# Patient Record
Sex: FEMALE | Race: WHITE | NOT HISPANIC OR LATINO | Employment: OTHER | URBAN - METROPOLITAN AREA
[De-identification: names, ages, dates, MRNs, and addresses within clinical notes are randomized per-mention and may not be internally consistent; named-entity substitution may affect disease eponyms.]

---

## 2017-01-17 ENCOUNTER — OFFICE VISIT (OUTPATIENT)
Dept: ENDOCRINOLOGY | Age: 32
End: 2017-01-17

## 2017-01-17 VITALS
HEIGHT: 62 IN | BODY MASS INDEX: 20.06 KG/M2 | WEIGHT: 109 LBS | DIASTOLIC BLOOD PRESSURE: 68 MMHG | SYSTOLIC BLOOD PRESSURE: 100 MMHG | HEART RATE: 106 BPM

## 2017-01-17 DIAGNOSIS — E10.8 TYPE 1 DIABETES MELLITUS WITH COMPLICATIONS (HCC): ICD-10-CM

## 2017-01-17 DIAGNOSIS — IMO0002 DIABETES MELLITUS TYPE 1, UNCONTROLLED, WITH COMPLICATIONS: Primary | ICD-10-CM

## 2017-01-17 DIAGNOSIS — E16.1 HYPERINSULINISM: ICD-10-CM

## 2017-01-17 DIAGNOSIS — G40.909 SEIZURE DISORDER (HCC): ICD-10-CM

## 2017-01-17 DIAGNOSIS — E55.9 VITAMIN D DEFICIENCY: ICD-10-CM

## 2017-01-17 PROCEDURE — 99214 OFFICE O/P EST MOD 30 MIN: CPT | Performed by: INTERNAL MEDICINE

## 2017-01-17 NOTE — MR AVS SNAPSHOT
Paradise Catherine   1/17/2017 1:15 PM   Office Visit    Dept Phone:  [phone]   Encounter #:  [id number]    Provider:  Giovanni MORGAN MD   Department:  [facility] ENDOCRINOLOGY                Your Full Care Plan              Today's Medication Changes          These changes are accurate as of: 1/17/17  1:59 PM.  If you have any questions, ask your nurse or doctor.               Medication(s)that have changed:     Diapers & Supplies misc   Depends pull-ups in small/medium.  Use as needed.  Needs 2 cases every other month.   What changed:  Another medication with the same name was removed. Continue taking this medication, and follow the directions you see here.   Changed by:  Katrin Cast MA       glucagon 1 MG injection   Commonly known as:  GLUCAGEN   Inject 1 mg into the shoulder, thigh, or buttocks 1 (One) Time As Needed for low blood sugar for up to 1 dose.   What changed:    - how much to take  - how to take this  - when to take this  - reasons to take this  - additional instructions   Changed by:  Giovanni MORGAN MD       glucose blood test strip   Commonly known as:  ONETOUCH VERIO   1 each by Other route 5 (Five) Times a Day. Use as instructed   What changed:  Another medication with the same name was removed. Continue taking this medication, and follow the directions you see here.   Changed by:  Giovanni MORGAN MD       Insulin Pen Needle 32G X 4 MM misc   1 each 5 (Five) Times a Day.   What changed:  Another medication with the same name was removed. Continue taking this medication, and follow the directions you see here.   Changed by:  Giovanni MORGAN MD         Stop taking medication(s)listed here:     aspirin-acetaminophen-caffeine 250-250-65 MG per tablet   Commonly known as:  EXCEDRIN MIGRAINE   Stopped by:  Giovanni MORGAN MD           loratadine 10 MG tablet   Commonly known as:  CLARITIN   Stopped by:  Giovanni  Joshua MORGAN MD           montelukast 10 MG tablet   Commonly known as:  SINGULAIR   Stopped by:  Giovanni MORGAN MD           raNITIdine 150 MG tablet   Commonly known as:  ZANTAC   Stopped by:  Giovanni MORGAN MD                Where to Get Your Medications      These medications were sent to [facility], [place] - [address] [address] - [phone]  - [phone]   [address] [facility] [address] [place] [address]     Phone:  [phone]     glucagon 1 MG injection    glucose blood test strip    Insulin Pen Needle 32G X 4 MM misc                  Your Updated Medication List          This list is accurate as of: 1/17/17  1:59 PM.  Always use your most recent med list.                acetaminophen 325 MG tablet   Commonly known as:  TYLENOL   Take 1 tablet by mouth every 6 (six) hours as needed for mild pain (1-3).       Cholecalciferol 2000 UNITS capsule       Diapers & Supplies misc   Depends pull-ups in small/medium.  Use as needed.  Needs 2 cases every other month.       DIASTAT ACUDIAL 20 MG rectal kit   Generic drug:  diazepam   INSERT 1 SYRINGE RECTALLY       diphenhydrAMINE 25 MG tablet   Commonly known as:  BENADRYL   Take 1 tablet by mouth every 6 (six) hours as needed for itching.       FLUoxetine 20 MG capsule   Commonly known as:  PROzac   Take 1 capsule by mouth daily.       glucagon 1 MG injection   Commonly known as:  GLUCAGEN   Inject 1 mg into the shoulder, thigh, or buttocks 1 (One) Time As Needed for low blood sugar for up to 1 dose.       glucose blood test strip   Commonly known as:  ONETOUCH VERIO   1 each by Other route 5 (Five) Times a Day. Use as instructed       insulin aspart 100 UNIT/ML solution pen-injector sc pen   Commonly known as:  novoLOG FLEXPEN   As directed 8- units per meal sc tidac       insulin detemir 100 UNIT/ML injection   Commonly known as:  LEVEMIR   Inject 40 Units under the skin every night.       Insulin Pen Needle 32G X 4 MM misc   1 each 5  (Five) Times a Day.       levocetirizine 5 MG tablet   Commonly known as:  XYZAL       omalizumab 150 MG injection   Commonly known as:  XOLAIR       OXcarbazepine 600 MG tablet   Commonly known as:  TRILEPTAL       SUNSCREEN SPF50 lotion   Apply generously every 2 hours while outside.       topiramate 25 MG tablet   Commonly known as:  TOPAMAX               We Performed the Following     Comprehensive Metabolic Panel     Hemoglobin A1c       You Were Diagnosed With        Codes Comments    Diabetes mellitus type 1, uncontrolled, with complications    -  Primary ICD-10-CM: E10.8, E10.65  ICD-9-CM: 250.93     Type 1 diabetes mellitus with complications     ICD-10-CM: E10.8  ICD-9-CM: 250.91     Hyperinsulinism     ICD-10-CM: E16.1  ICD-9-CM: 251.1       Instructions     None    Patient Instructions History      Upcoming Appointments     Visit Type Date Time Department    OFFICE VISIT 1/17/2017  1:15 PM [facility]    FOLLOW UP 1/19/2017  3:30 PM [facility]    OFFICE VISIT 5/12/2017  1:15 PM [facility]DE WATERSU      [employer] Signup     Our records indicate that you have an active [employer] account.    You can view your After Visit Summary by going to [employer] and logging in with your [employer] username and password.  If you don't have a [employer] username and password but a parent or guardian has access to your record, the parent or guardian should login with their own [employer] username and password and access your record to view the After Visit Summary.    If you have questions, you can email [email or web address] or call [phone] to talk to our [employer] staff.  Remember, [employer] is NOT to be used for urgent needs.  For medical emergencies, dial 911.               Other Info from Your Visit           Your Appointments     Jan 19, 2017  3:30 PM EST   Follow Up with Mickey Vyas Jr., MD   [facility] (--)    [address]  [address]  [facility] [address] [phone]           Arrive 15 minutes prior to appointment.            May 12, 2017  1:15 PM EDT   Office Visit with Giovanni MORGAN MD   [facility] ENDOCRINOLOGY (--)    [address] [facility] [address]  [facility] [address] [phone]           Arrive 15 minutes prior to appointment.              Allergies     No Known Allergies      Reason for Visit     Diabetes           Vital Signs     Blood Pressure Pulse Height Weight Body Mass Index Smoking Status    100/68 106 62\" (157.5 cm) 109 lb (49.4 kg) 19.94 kg/m2 Never Smoker      Problems and Diagnoses Noted     Diabetes mellitus type 1, uncontrolled, with complications    Hyperinsulinism    Type I diabetes with complications    Uncontrolled type 2 diabetes mellitus with complication, with long-term current use of insulin        "

## 2017-01-17 NOTE — PROGRESS NOTES
31 y.o.    Patient Care Team:  Drew Blanchard MD as PCP - General (Family Medicine)  Angel Roque MD as PCP - Family Medicine    Chief Complaint:        F/U TYPE 1 DIABETES, UNCONTROLLED.  Subjective     HPI  Patient is a 31-year-old white female with a past history of severe mental retardation and behavioral problems, seizure disorder, type 1 diabetes since age 18 came for followup. Patient is accompanied by her parents    Patient's blood sugars have been fluctuating in the  range  She has been compliant with insulin regimen  Hypoglycemia  Patient has had episodes of hypoglycemia but appear to be much less than before. She spends a portion of the day at the .    Seizure disorder  Patient is currently on medication appears to be stable and she's nonverbal  Vitamin D deficiency  Patient is currently getting 2000 units a vitamin D3 daily at dinner    Interval History  Patient is a 31-year-old white female with a history of mental retardation and behavioral problems, seizure disorder, type I diabetes mellitus since age 18 came for followup evaluation. Patient was brought in by both parents who are the primary historians since patient is noncommunicative.  They reported that patient was diabetic for the past 10 years, has been on different insulin regimens, a few years ago started Levemir and NovoLog, at that time they were seeing an endocrinologist in [place].  She gets 29 units of l Levemir in the morning and 5 units at night, NovoLog 1 unit for every 15 carbs and 1 unit for every 30-50 mg a blood sugar over 150 for correction.  They reported usual hypoglycemia in the early morning hours, and higher blood sugars in the 200-250 range at dinnertime or suppertime. Patient apparently spends most of the day at the , and sometimes she does not get NovoLog for lunch at the . Apparently the staff decide to give insulin or not give insulin based on patient's behavior and blood  sugar.  both parents denied any knowledge of diabetic retinopathy, nephropathy, neuropathy   Does not denied any knowledge of thyroid disorder  Mother is trying to get her eye examination done sometime this year  Patient is mostly nonverbal, gives only a momentary eye contact.  According to the parents patient has done relatively well since her last adjustment of insulin dose  Most of the blood sugars have been less than 200 at home. The  center did not send any Accu-Chek log this time.   Parents report that patient developed a rash with severe itching over the past several months and has been on prednisone on and off. Currently she is taking 10 mg prednisone taper and will probably on this medication for several weeks. Patient's blood sugars have been elevated in the 200-300 range and they have been increasing the insulin and gradually.  She is already seeing the dermatologist and 2 allergy specialists with no clear etiology identified. Medication list was reviewed but did not seem specifically connected to the rash.  Interval history  Mother reported that since they decided Levemir and NovoLog were not causing the rash as per the allergist she started going back on these medications. She has been on prednisone for the past several months and her blood sugars have been in the 200-400 range. They started tapering the prednisone and currently she is taking 5 mg daily and will soon start taking 5 mg every other day.  She has been seeing allergist and is currently receiving new medication Zolair IV first dose was 2 weeks ago  They believe that her rash has significantly improved just within the past week     The following portions of the patient's history were reviewed and updated as appropriate: allergies, current medications, past family history, past medical history, past social history, past surgical history and problem list.    Past Medical History   Diagnosis Date   • Cerebral palsy      birth   • Diabetes  mellitus    • Diabetes mellitus type I    • Hives      chronic   • Seizures      Family History   Problem Relation Age of Onset   • Hypertension Mother    • Hyperlipidemia Mother    • Seasonal affective disorder Father      Social History     Social History   • Marital status: Single     Spouse name: N/A   • Number of children: N/A   • Years of education: N/A     Occupational History   • Not on file.     Social History Main Topics   • Smoking status: Never Smoker   • Smokeless tobacco: Not on file   • Alcohol use No   • Drug use: Not on file   • Sexual activity: No     Other Topics Concern   • Not on file     Social History Narrative     No Known Allergies    Current Outpatient Prescriptions:   •  acetaminophen (TYLENOL) 325 MG tablet, Take 1 tablet by mouth every 6 (six) hours as needed for mild pain (1-3)., Disp: 30 tablet, Rfl: 11  •  aspirin-acetaminophen-caffeine (EXCEDRIN MIGRAINE) 250-250-65 MG per tablet, Take 1 tablet by mouth every 6 (six) hours as needed for headaches., Disp: 30 tablet, Rfl: 11  •  Cholecalciferol 2000 UNITS capsule, Take  by mouth., Disp: , Rfl:   •  Diapers & Supplies misc, Depends pull-ups in small/medium.  Use as needed.  Needs 2 cases every other month., Disp: 2 each, Rfl: 5  •  Diapers & Supplies misc, Chux absorbent pads.   Use as needed. Needs 1 case monthly., Disp: 1 each, Rfl: 11  •  DIASTAT ACUDIAL 20 MG rectal kit, INSERT 1 SYRINGE RECTALLY, Disp: 20 mg, Rfl: 4  •  diphenhydrAMINE (BENADRYL) 25 MG tablet, Take 1 tablet by mouth every 6 (six) hours as needed for itching., Disp: 30 tablet, Rfl: 11  •  FLUoxetine (PROzac) 20 MG capsule, Take 1 capsule by mouth daily., Disp: 30 capsule, Rfl: 11  •  glucagon (GLUCAGEN) 1 MG injection, Glucagon Emergency 1 MG Injection Kit; Patient Sig: Glucagon Emergency 1 MG Injection Kit USE AS DIRECTED; 4; 3; 10-Mar-2015; Active, Disp: , Rfl:   •  glucose blood (ONETOUCH VERIO) test strip, 1 each by Other route 5 (five) times a day. Use as  "instructed, Disp: 150 each, Rfl: 5  •  glucose blood test strip, 1 each by Other route 5 (five) times a day., Disp: 150 each, Rfl: 5  •  insulin aspart (NOVOLOG FLEXPEN) 100 UNIT/ML solution pen-injector sc pen, As directed 8- units per meal sc tidac, Disp: 15 mL, Rfl: 11  •  insulin detemir (LEVEMIR) 100 UNIT/ML injection, Inject 40 Units under the skin every night., Disp: 5 pen, Rfl: 11  •  Insulin Pen Needle 32G X 4 MM misc, , Disp: , Rfl:   •  Insulin Pen Needle 32G X 4 MM misc, 1 each 5 (five) times a day., Disp: 150 each, Rfl: 5  •  levocetirizine (XYZAL) 5 MG tablet, Take 5 mg by mouth every evening., Disp: , Rfl:   •  loratadine (CLARITIN) 10 MG tablet, Take  by mouth., Disp: , Rfl:   •  montelukast (SINGULAIR) 10 MG tablet, Take  by mouth daily., Disp: , Rfl:   •  omalizumab (XOLAIR) 150 MG injection, Inject 300 mg under the skin 1 (one) time., Disp: , Rfl:   •  OXcarbazepine (TRILEPTAL) 600 MG tablet, Take 600 mg by mouth 2 (two) times a day. Take 1 1/2 tab bid, Disp: , Rfl:   •  ranitidine (ZANTAC) 150 MG tablet, Take  by mouth., Disp: , Rfl:   •  SUNSCREEN SPF50 lotion, Apply generously every 2 hours while outside., Disp: 1 bottle, Rfl: 11  •  topiramate (TOPAMAX) 25 MG tablet, Take 75 mg by mouth 2 (two) times a day., Disp: , Rfl:         Review of Systems   Constitutional: Negative for chills, fatigue and fever.   Cardiovascular: Negative for chest pain and palpitations.   Gastrointestinal: Negative for abdominal pain, constipation, diarrhea, nausea and vomiting.   Endocrine: Negative for cold intolerance and heat intolerance.   All other systems reviewed and are negative.      Objective       Vitals:    01/17/17 1314   BP: 100/68   Pulse: 106   Weight: 109 lb (49.4 kg)   Height: 62\" (157.5 cm)     Body mass index is 19.94 kg/(m^2).      Physical Exam   Neck: Normal range of motion. Neck supple. No thyromegaly present.   Cardiovascular: Normal rate, regular rhythm, normal heart sounds and intact distal " pulses.    Pulmonary/Chest: Effort normal and breath sounds normal.   Abdominal: Soft. Bowel sounds are normal. She exhibits no distension. There is no tenderness.   Musculoskeletal: Normal range of motion. She exhibits no edema.   Skin: Skin is warm and dry.   Psychiatric: Her behavior is normal.   Nursing note and vitals reviewed.    Results Review:     I reviewed the patient's new clinical results.    Medical records reviewed  Summary:      Office Visit on 07/18/2016   Component Date Value Ref Range Status   • Glucose 07/18/2016 72  65 - 99 mg/dL Final   • BUN 07/18/2016 16  6 - 20 mg/dL Final   • Creatinine 07/18/2016 0.64  0.57 - 1.00 mg/dL Final   • eGFR Non African Am 07/18/2016 109  >60 mL/min/1.73 Final   • eGFR African Am 07/18/2016 132  >60 mL/min/1.73 Final   • BUN/Creatinine Ratio 07/18/2016 25.0  7.0 - 25.0 Final   • Sodium 07/18/2016 141  136 - 145 mmol/L Final   • Potassium 07/18/2016 4.2  3.5 - 5.2 mmol/L Final   • Chloride 07/18/2016 105  98 - 107 mmol/L Final   • Total CO2 07/18/2016 22.3  22.0 - 29.0 mmol/L Final   • Calcium 07/18/2016 8.9  8.6 - 10.5 mg/dL Final   • Total Protein 07/18/2016 6.9  6.0 - 8.5 g/dL Final   • Albumin 07/18/2016 4.40  3.50 - 5.20 g/dL Final   • Globulin 07/18/2016 2.5  gm/dL Final   • A/G Ratio 07/18/2016 1.8  g/dL Final   • Total Bilirubin 07/18/2016 <0.2  0.1 - 1.2 mg/dL Final   • Alkaline Phosphatase 07/18/2016 98  39 - 117 U/L Final   • AST (SGOT) 07/18/2016 17  1 - 32 U/L Final   • ALT (SGPT) 07/18/2016 11  1 - 33 U/L Final   • Hemoglobin A1C 07/18/2016 6.00* 4.80 - 5.60 % Final    Comment: Hemoglobin A1C Ranges:  Increased Risk for Diabetes  5.7% to 6.4%  Diabetes                     >= 6.5%  Diabetic Goal                < 7.0%     • 25 Hydroxy, Vitamin D 07/18/2016 46.5  ng/mL Final    Comment: Reference Range for Total Vitamin D 25(OH)  Deficiency    <20.0 ng/mL  Insufficiency 21-29 ng/mL  Sufficiency    ng/mL  Toxicity      >100 ng/ml          Lab  Results   Component Value Date    HGBA1C 6.00 (H) 07/18/2016    HGBA1C 7.5 (H) 01/11/2016    HGBA1C 5.7 (H) 01/20/2015     Lab Results   Component Value Date    CREATININE 0.64 07/18/2016     Imaging Results (most recent)     None                Assessment and Plan:    Paradise was seen today for diabetes.    Diagnoses and all orders for this visit:    Diabetes mellitus type 1, uncontrolled, with complications  -     Comprehensive Metabolic Panel  -     Hemoglobin A1c    Type 1 diabetes mellitus with complications  -     glucose blood (ONETOUCH VERIO) test strip; 1 each by Other route 5 (Five) Times a Day. Use as instructed  -     Insulin Pen Needle 32G X 4 MM misc; 1 each 5 (Five) Times a Day.  -     Comprehensive Metabolic Panel  -     Hemoglobin A1c    Hyperinsulinism  -     Comprehensive Metabolic Panel  -     Hemoglobin A1c    Seizure disorder    Vitamin D deficiency    Other orders  -     glucagon (GLUCAGEN) 1 MG injection; Inject 1 mg into the shoulder, thigh, or buttocks 1 (One) Time As Needed for low blood sugar for up to 1 dose.        #1 uncontrolled type I diabetes mellitus with the last hemoglobin A1c was 6.9%   #2 hypoglycemia  #3 seizure disorder currently on medication but apparently hypoglycemia is the trigger, and they are trying to avoid that.  #4 vitamin D deficiency patient is currently taking 1000 units daily    Glucometer download reviewed  Blood sugars fluctuating from   Especially for one week she had low blood sugars and elevated blood sugars due to sickness otherwise she had very few hypoglycemic episodes.    She is currently eating well  Taking Levemir 32 units in the morning and 12 units at night  NovoLog as per the plan  Patient spends half a day at the   I recommend that Accu-Chek be checked at lunchtime as well as any other time as needed  Patient will get lab testing done today  She will return to follow-up in 3 months.    The total time spent for old record and lab review  and face- to- face was more than 25 min of which greater than 50% of time was spent on counseling the patient on recommended evaluation and treatment options, instructions for management/treatment and /or follow up  and importance of compliance with chosen management or treatment options       Giovanni Lewis MD. FACE    01/17/17      EMR Dragon / transcription disclaimer:    Much of this encounter note is an electronic transcription/ translation of spoken language to printed text.  Electronic translation of spoken language may permit erroneous, or at times, nonsensical words or phrases to be inadvertently transcribed; although I have reviewed the note for such errors, some may still exist.

## 2017-01-18 LAB
ALBUMIN SERPL-MCNC: 4.2 G/DL (ref 3.5–5.2)
ALBUMIN/GLOB SERPL: 1.8 G/DL
ALP SERPL-CCNC: 80 U/L (ref 39–117)
ALT SERPL-CCNC: 20 U/L (ref 1–33)
AST SERPL-CCNC: 19 U/L (ref 1–32)
BILIRUB SERPL-MCNC: 0.3 MG/DL (ref 0.1–1.2)
BUN SERPL-MCNC: 14 MG/DL (ref 6–20)
BUN/CREAT SERPL: 22.6 (ref 7–25)
CALCIUM SERPL-MCNC: 8.8 MG/DL (ref 8.6–10.5)
CHLORIDE SERPL-SCNC: 103 MMOL/L (ref 98–107)
CO2 SERPL-SCNC: 22.7 MMOL/L (ref 22–29)
CREAT SERPL-MCNC: 0.62 MG/DL (ref 0.57–1)
GLOBULIN SER CALC-MCNC: 2.4 GM/DL
GLUCOSE SERPL-MCNC: 71 MG/DL (ref 65–99)
HBA1C MFR BLD: 5.71 % (ref 4.8–5.6)
POTASSIUM SERPL-SCNC: 3.7 MMOL/L (ref 3.5–5.2)
PROT SERPL-MCNC: 6.6 G/DL (ref 6–8.5)
SODIUM SERPL-SCNC: 141 MMOL/L (ref 136–145)

## 2017-01-19 ENCOUNTER — OFFICE VISIT (OUTPATIENT)
Dept: NEUROLOGY | Facility: CLINIC | Age: 32
End: 2017-01-19

## 2017-01-19 VITALS — BODY MASS INDEX: 20.06 KG/M2 | WEIGHT: 109 LBS | HEIGHT: 62 IN

## 2017-01-19 DIAGNOSIS — G40.909 SEIZURE DISORDER (HCC): ICD-10-CM

## 2017-01-19 DIAGNOSIS — G80.1 SPASTIC DIPLEGIC CEREBRAL PALSY (HCC): Primary | ICD-10-CM

## 2017-01-19 PROCEDURE — 99213 OFFICE O/P EST LOW 20 MIN: CPT | Performed by: PSYCHIATRY & NEUROLOGY

## 2017-01-19 RX ORDER — TOPIRAMATE 25 MG/1
75 TABLET ORAL 2 TIMES DAILY
Qty: 180 TABLET | Refills: 11 | Status: SHIPPED | OUTPATIENT
Start: 2017-01-19 | End: 2018-01-23 | Stop reason: SDUPTHER

## 2017-01-19 RX ORDER — OXCARBAZEPINE 600 MG/1
600 TABLET, FILM COATED ORAL 2 TIMES DAILY
Qty: 90 TABLET | Refills: 11 | Status: SHIPPED | OUTPATIENT
Start: 2017-01-19 | End: 2018-01-23 | Stop reason: SDUPTHER

## 2017-01-19 RX ORDER — DIAZEPAM 20 MG/4ML
20 GEL RECTAL ONCE
Qty: 20 MG | Refills: 4 | Status: SHIPPED | OUTPATIENT
Start: 2017-01-19 | End: 2017-01-19

## 2017-01-19 NOTE — LETTER
January 19, 2017     Drew Blanchard MD  [address]  [place] [address]    Patient: Paradise Catherine   YOB: 1985   Date of Visit: 1/19/2017       Dear Dr. Lo MD:    Thank you for referring Paradise Catherine to me for evaluation. Below are the relevant portions of my assessment and plan of care.    If you have questions, please do not hesitate to call me. I look forward to following Paradise along with you.         Sincerely,        Mickey Vyas MD        CC: No Recipients

## 2017-01-19 NOTE — MR AVS SNAPSHOT
Paradise Catherine   1/19/2017 3:30 PM   Office Visit    Dept Phone:  [phone]   Encounter #:  [id number]    Provider:  Mickey Vyas Jr., MD   Department:  [facility] NEUROLOGY                Your Full Care Plan              Today's Medication Changes          These changes are accurate as of: 1/19/17  4:01 PM.  If you have any questions, ask your nurse or doctor.               Medication(s)that have changed:     DIASTAT ACUDIAL 20 MG rectal kit   Generic drug:  diazepam   Insert 20 mg into the rectum 1 (One) Time for 1 dose.   What changed:  See the new instructions.   Changed by:  Mickey Vyas Jr., MD            Where to Get Your Medications      These medications were sent to [address] [phone] [facility] [phone]   [address] [address]     Phone:  [phone]     OXcarbazepine 600 MG tablet    topiramate 25 MG tablet         You can get these medications from any pharmacy     Bring a paper prescription for each of these medications     DIASTAT ACUDIAL 20 MG rectal kit                  Your Updated Medication List          This list is accurate as of: 1/19/17  4:01 PM.  Always use your most recent med list.                acetaminophen 325 MG tablet   Commonly known as:  TYLENOL   Take 1 tablet by mouth every 6 (six) hours as needed for mild pain (1-3).       Cholecalciferol 2000 UNITS capsule       Diapers & Supplies misc   Depends pull-ups in small/medium.  Use as needed.  Needs 2 cases every other month.       DIASTAT ACUDIAL 20 MG rectal kit   Generic drug:  diazepam   Insert 20 mg into the rectum 1 (One) Time for 1 dose.       diphenhydrAMINE 25 MG tablet   Commonly known as:  BENADRYL   Take 1 tablet by mouth every 6 (six) hours as needed for itching.       FLUoxetine 20 MG capsule   Commonly known as:  PROzac   Take 1 capsule by mouth daily.       glucagon 1 MG injection   Commonly  known as:  GLUCAGEN   Inject 1 mg into the shoulder, thigh, or buttocks 1 (One) Time As Needed for low blood sugar for up to 1 dose.       glucose blood test strip   Commonly known as:  ONETOUCH VERIO   1 each by Other route 5 (Five) Times a Day. Use as instructed       insulin aspart 100 UNIT/ML solution pen-injector sc pen   Commonly known as:  novoLOG FLEXPEN   As directed 8- units per meal sc tidac       insulin detemir 100 UNIT/ML injection   Commonly known as:  LEVEMIR   Inject 40 Units under the skin every night.       Insulin Pen Needle 32G X 4 MM misc   1 each 5 (Five) Times a Day.       levocetirizine 5 MG tablet   Commonly known as:  XYZAL       omalizumab 150 MG injection   Commonly known as:  XOLAIR       OXcarbazepine 600 MG tablet   Commonly known as:  TRILEPTAL   Take 1 tablet by mouth 2 (Two) Times a Day. Take 1 1/2 tab bid       SUNSCREEN SPF50 lotion   Apply generously every 2 hours while outside.       topiramate 25 MG tablet   Commonly known as:  TOPAMAX   Take 3 tablets by mouth 2 (Two) Times a Day.               You Were Diagnosed With        Codes Comments    Spastic diplegic cerebral palsy    -  Primary ICD-10-CM: G80.1  ICD-9-CM: 343.0     Seizure disorder     ICD-10-CM: G40.909  ICD-9-CM: 345.90       Instructions     None    Patient Instructions History      Upcoming Appointments     Visit Type Date Time Department    FOLLOW UP 1/19/2017  3:30 PM [facility] NEUROLOGY MARCIASGE    OFFICE VISIT 5/12/2017  1:15 PM [facility] ENDO KREE MELLY      [employer] Signup     Our records indicate that you have an active [employer] account.    You can view your After Visit Summary by going to [employer] and logging in with your [employer] username and password.  If you don't have a [employer] username and password but a parent or guardian has access to your record, the parent or guardian should login with their own [employer] username and password and access your record to view the After Visit  "Summary.    If you have questions, you can email [email or web address] or call [phone] to talk to our MyChart staff.  Remember, [employer] is NOT to be used for urgent needs.  For medical emergencies, dial 911.               Other Info from Your Visit           Your Appointments     May 12, 2017  1:15 PM EDT   Office Visit with Giovanni MORGAN MD   [facility] ENDOCRINOLOGY (--)    [address]  [facility] [address] [phone]           Arrive 15 minutes prior to appointment.            Jan 23, 2018  2:00 PM EST   Follow Up with Mickey Vyas Jr., MD   [facility] NEUROLOGY (--)    [address]  [facility] [address] [phone]           Arrive 15 minutes prior to appointment.              Allergies     No Known Allergies      Reason for Visit     Seizures           Vital Signs     Height Weight Body Mass Index Smoking Status          62\" (157.5 cm) 109 lb (49.4 kg) 19.94 kg/m2 Never Smoker        Problems and Diagnoses Noted     Cerebral palsy    Seizure disorder        "

## 2017-01-19 NOTE — PROGRESS NOTES
Subjective:     Patient ID: Paradise Catherine is a 31 y.o. female.    History of Present Illness   The patient is doing relatively well from a seizure standpoint.  Medications were reviewed.  She's not having side effects.  She does have an occasional breakthrough seizure and on rare occasion she has required Diastat.  The parents are happy with her control.  History was taken from both parents as well as.  The following portions of the patient's history were reviewed and updated as appropriate: allergies, current medications, past family history, past medical history, past social history, past surgical history and problem list.      Current Outpatient Prescriptions:   •  acetaminophen (TYLENOL) 325 MG tablet, Take 1 tablet by mouth every 6 (six) hours as needed for mild pain (1-3)., Disp: 30 tablet, Rfl: 11  •  Cholecalciferol 2000 UNITS capsule, Take  by mouth., Disp: , Rfl:   •  Diapers & Supplies misc, Depends pull-ups in small/medium.  Use as needed.  Needs 2 cases every other month., Disp: 2 each, Rfl: 5  •  DIASTAT ACUDIAL 20 MG rectal kit, Insert 20 mg into the rectum 1 (One) Time for 1 dose., Disp: 20 mg, Rfl: 4  •  diphenhydrAMINE (BENADRYL) 25 MG tablet, Take 1 tablet by mouth every 6 (six) hours as needed for itching., Disp: 30 tablet, Rfl: 11  •  FLUoxetine (PROzac) 20 MG capsule, Take 1 capsule by mouth daily., Disp: 30 capsule, Rfl: 11  •  glucagon (GLUCAGEN) 1 MG injection, Inject 1 mg into the shoulder, thigh, or buttocks 1 (One) Time As Needed for low blood sugar for up to 1 dose., Disp: 2 kit, Rfl: 6  •  glucose blood (ONETOUCH VERIO) test strip, 1 each by Other route 5 (Five) Times a Day. Use as instructed, Disp: 150 each, Rfl: 5  •  insulin aspart (NOVOLOG FLEXPEN) 100 UNIT/ML solution pen-injector sc pen, As directed 8- units per meal sc tidac, Disp: 15 mL, Rfl: 11  •  insulin detemir (LEVEMIR) 100 UNIT/ML injection, Inject 40 Units under the skin every night., Disp: 5 pen, Rfl: 11  •  Insulin  Pen Needle 32G X 4 MM misc, 1 each 5 (Five) Times a Day., Disp: 150 each, Rfl: 5  •  levocetirizine (XYZAL) 5 MG tablet, Take 5 mg by mouth every evening., Disp: , Rfl:   •  omalizumab (XOLAIR) 150 MG injection, Inject 300 mg under the skin 1 (one) time., Disp: , Rfl:   •  OXcarbazepine (TRILEPTAL) 600 MG tablet, Take 1 tablet by mouth 2 (Two) Times a Day. Take 1 1/2 tab bid, Disp: 90 tablet, Rfl: 11  •  SUNSCREEN SPF50 lotion, Apply generously every 2 hours while outside., Disp: 1 bottle, Rfl: 11  •  topiramate (TOPAMAX) 25 MG tablet, Take 3 tablets by mouth 2 (Two) Times a Day., Disp: 180 tablet, Rfl: 11    Review of Systems   Constitutional: Negative.    Neurological: Positive for seizures (LAST SEIZURE WAS 2 WEEKS AGO). Negative for dizziness, tremors, syncope, facial asymmetry, speech difficulty, weakness, light-headedness, numbness and headaches.   Psychiatric/Behavioral: Negative.         Objective:    Neurologic Exam  The patient is mentally handicapped.  She has spastic diplegia.  Physical Exam    Assessment/Plan:     Paradise was seen today for seizures.    Diagnoses and all orders for this visit:    Spastic diplegic cerebral palsy    Seizure disorder    Other orders  -     topiramate (TOPAMAX) 25 MG tablet; Take 3 tablets by mouth 2 (Two) Times a Day.  -     OXcarbazepine (TRILEPTAL) 600 MG tablet; Take 1 tablet by mouth 2 (Two) Times a Day. Take 1 1/2 tab bid  -     DIASTAT ACUDIAL 20 MG rectal kit; Insert 20 mg into the rectum 1 (One) Time for 1 dose.       Prescription drug management - medications as above    Follow-up in the office in one year.Thank you for allowing me to share in the care of this patient.  Mickey Vyas M.D.

## 2017-03-23 ENCOUNTER — TELEPHONE (OUTPATIENT)
Dept: INTERNAL MEDICINE | Facility: CLINIC | Age: 32
End: 2017-03-23

## 2017-03-23 NOTE — TELEPHONE ENCOUNTER
----- Message from Mireille Nayak MA sent at 3/21/2017  3:34 PM EDT -----      ----- Message -----     From: Katrin Tong     Sent: 3/21/2017   2:59 PM       To: Mireille Nayak MA    Trish - mother [phone]    Patient is needing written/printed scripts for:    1.  Acetaminophen 325 mg tablet 1 Q 6 hrs PRN mild pain #30  2.  Diphenhydramine 25 mg 1 Q 6 hrs. PRN itching  3.  Sunscreen SPF 50 lotion apply chapito every 2 hours while outside    Trish said that these will be for Apple Patch and her current scripts are .    Dr. Blanchard

## 2017-04-04 RX ORDER — DIPHENHYDRAMINE HCL 25 MG
25 TABLET ORAL EVERY 6 HOURS PRN
Qty: 30 TABLET | Refills: 11 | Status: SHIPPED | OUTPATIENT
Start: 2017-04-04 | End: 2017-04-04 | Stop reason: SDUPTHER

## 2017-04-04 RX ORDER — ACETAMINOPHEN 325 MG/1
325 TABLET ORAL EVERY 6 HOURS PRN
Qty: 30 TABLET | Refills: 11 | Status: SHIPPED | OUTPATIENT
Start: 2017-04-04 | End: 2017-04-04 | Stop reason: SDUPTHER

## 2017-04-04 RX ORDER — ACETAMINOPHEN 325 MG/1
325 TABLET ORAL EVERY 6 HOURS PRN
Qty: 30 TABLET | Refills: 11 | Status: SHIPPED | OUTPATIENT
Start: 2017-04-04 | End: 2018-05-14 | Stop reason: SDUPTHER

## 2017-04-04 RX ORDER — DIPHENHYDRAMINE HCL 25 MG
25 TABLET ORAL EVERY 6 HOURS PRN
Qty: 30 TABLET | Refills: 11 | Status: SHIPPED | OUTPATIENT
Start: 2017-04-04 | End: 2018-05-14 | Stop reason: SDUPTHER

## 2017-04-10 RX ORDER — FLUOXETINE HYDROCHLORIDE 20 MG/1
CAPSULE ORAL
Qty: 30 CAPSULE | Refills: 10 | Status: SHIPPED | OUTPATIENT
Start: 2017-04-10 | End: 2018-03-11 | Stop reason: SDUPTHER

## 2017-05-11 ENCOUNTER — OFFICE VISIT (OUTPATIENT)
Dept: INTERNAL MEDICINE | Facility: CLINIC | Age: 32
End: 2017-05-11

## 2017-05-11 VITALS
SYSTOLIC BLOOD PRESSURE: 100 MMHG | BODY MASS INDEX: 19.25 KG/M2 | DIASTOLIC BLOOD PRESSURE: 60 MMHG | OXYGEN SATURATION: 98 % | TEMPERATURE: 98.6 F | HEIGHT: 62 IN | HEART RATE: 104 BPM | WEIGHT: 104.6 LBS

## 2017-05-11 DIAGNOSIS — Z00.00 ANNUAL PHYSICAL EXAM: Primary | ICD-10-CM

## 2017-05-11 PROCEDURE — 99395 PREV VISIT EST AGE 18-39: CPT | Performed by: FAMILY MEDICINE

## 2017-05-30 ENCOUNTER — OFFICE VISIT (OUTPATIENT)
Dept: ENDOCRINOLOGY | Age: 32
End: 2017-05-30

## 2017-05-30 VITALS
SYSTOLIC BLOOD PRESSURE: 106 MMHG | HEART RATE: 91 BPM | HEIGHT: 62 IN | OXYGEN SATURATION: 99 % | BODY MASS INDEX: 19.21 KG/M2 | WEIGHT: 104.4 LBS | DIASTOLIC BLOOD PRESSURE: 66 MMHG

## 2017-05-30 DIAGNOSIS — E16.1 HYPERINSULINISM: ICD-10-CM

## 2017-05-30 DIAGNOSIS — F79 MENTAL RETARDATION: ICD-10-CM

## 2017-05-30 DIAGNOSIS — Z79.4 ENCOUNTER FOR LONG-TERM (CURRENT) USE OF INSULIN (HCC): ICD-10-CM

## 2017-05-30 DIAGNOSIS — E55.9 VITAMIN D DEFICIENCY: ICD-10-CM

## 2017-05-30 DIAGNOSIS — E10.8 TYPE 1 DIABETES MELLITUS WITH COMPLICATIONS (HCC): ICD-10-CM

## 2017-05-30 DIAGNOSIS — IMO0002 DIABETES MELLITUS TYPE 1, UNCONTROLLED, WITH COMPLICATIONS: Primary | ICD-10-CM

## 2017-05-30 DIAGNOSIS — G40.909 SEIZURE DISORDER (HCC): ICD-10-CM

## 2017-05-30 LAB
ALBUMIN SERPL-MCNC: 4.1 G/DL (ref 3.5–5.2)
ALBUMIN/GLOB SERPL: 1.3 G/DL
ALP SERPL-CCNC: 90 U/L (ref 39–117)
ALT SERPL-CCNC: 22 U/L (ref 1–33)
AST SERPL-CCNC: 22 U/L (ref 1–32)
BASOPHILS # BLD AUTO: 0.01 10*3/MM3 (ref 0–0.2)
BASOPHILS NFR BLD AUTO: 0.3 % (ref 0–1.5)
BILIRUB SERPL-MCNC: 0.2 MG/DL (ref 0.1–1.2)
BUN SERPL-MCNC: 16 MG/DL (ref 6–20)
BUN/CREAT SERPL: 23.5 (ref 7–25)
CALCIUM SERPL-MCNC: 9.2 MG/DL (ref 8.6–10.5)
CHLORIDE SERPL-SCNC: 102 MMOL/L (ref 98–107)
CO2 SERPL-SCNC: 24.7 MMOL/L (ref 22–29)
CREAT SERPL-MCNC: 0.68 MG/DL (ref 0.57–1)
EOSINOPHIL # BLD AUTO: 0 10*3/MM3 (ref 0–0.7)
EOSINOPHIL NFR BLD AUTO: 0 % (ref 0.3–6.2)
ERYTHROCYTE [DISTWIDTH] IN BLOOD BY AUTOMATED COUNT: 12.4 % (ref 11.7–13)
GLOBULIN SER CALC-MCNC: 3.1 GM/DL
GLUCOSE SERPL-MCNC: 236 MG/DL (ref 65–99)
HBA1C MFR BLD: 6.25 % (ref 4.8–5.6)
HCT VFR BLD AUTO: 36.2 % (ref 35.6–45.5)
HGB BLD-MCNC: 11.9 G/DL (ref 11.9–15.5)
IMM GRANULOCYTES # BLD: 0 10*3/MM3 (ref 0–0.03)
IMM GRANULOCYTES NFR BLD: 0 % (ref 0–0.5)
LYMPHOCYTES # BLD AUTO: 1.2 10*3/MM3 (ref 0.9–4.8)
LYMPHOCYTES NFR BLD AUTO: 36.9 % (ref 19.6–45.3)
MCH RBC QN AUTO: 30.9 PG (ref 26.9–32)
MCHC RBC AUTO-ENTMCNC: 32.9 G/DL (ref 32.4–36.3)
MCV RBC AUTO: 94 FL (ref 80.5–98.2)
MONOCYTES # BLD AUTO: 0.34 10*3/MM3 (ref 0.2–1.2)
MONOCYTES NFR BLD AUTO: 10.5 % (ref 5–12)
NEUTROPHILS # BLD AUTO: 1.7 10*3/MM3 (ref 1.9–8.1)
NEUTROPHILS NFR BLD AUTO: 52.3 % (ref 42.7–76)
PLATELET # BLD AUTO: 245 10*3/MM3 (ref 140–500)
POTASSIUM SERPL-SCNC: 4.4 MMOL/L (ref 3.5–5.2)
PROT SERPL-MCNC: 7.2 G/DL (ref 6–8.5)
RBC # BLD AUTO: 3.85 10*6/MM3 (ref 3.9–5.2)
SODIUM SERPL-SCNC: 138 MMOL/L (ref 136–145)
TSH SERPL DL<=0.005 MIU/L-ACNC: 2 MIU/ML (ref 0.27–4.2)
UNABLE TO VOID: NORMAL
WBC # BLD AUTO: 3.25 10*3/MM3 (ref 4.5–10.7)

## 2017-05-30 PROCEDURE — 99214 OFFICE O/P EST MOD 30 MIN: CPT | Performed by: INTERNAL MEDICINE

## 2017-05-31 LAB — 25(OH)D3+25(OH)D2 SERPL-MCNC: 71.5 NG/ML (ref 30–100)

## 2017-07-28 DIAGNOSIS — E10.8 TYPE 1 DIABETES MELLITUS WITH COMPLICATIONS (HCC): ICD-10-CM

## 2017-07-29 RX ORDER — PEN NEEDLE, DIABETIC 30 GX3/16"
NEEDLE, DISPOSABLE MISCELLANEOUS
Qty: 100 EACH | Refills: 4 | Status: SHIPPED | OUTPATIENT
Start: 2017-07-29 | End: 2017-11-12 | Stop reason: SDUPTHER

## 2017-08-19 DIAGNOSIS — E10.8 TYPE 1 DIABETES MELLITUS WITH COMPLICATIONS (HCC): ICD-10-CM

## 2017-08-19 DIAGNOSIS — IMO0002 DIABETES MELLITUS TYPE 2, UNCONTROLLED, WITH COMPLICATIONS: ICD-10-CM

## 2017-08-21 RX ORDER — INSULIN DETEMIR 100 [IU]/ML
INJECTION, SOLUTION SUBCUTANEOUS
Qty: 5 PEN | Refills: 10 | Status: SHIPPED | OUTPATIENT
Start: 2017-08-21 | End: 2018-02-19 | Stop reason: SDUPTHER

## 2017-09-06 ENCOUNTER — OFFICE VISIT (OUTPATIENT)
Dept: RETAIL CLINIC | Facility: CLINIC | Age: 32
End: 2017-09-06

## 2017-09-06 VITALS
RESPIRATION RATE: 18 BRPM | OXYGEN SATURATION: 98 % | SYSTOLIC BLOOD PRESSURE: 110 MMHG | DIASTOLIC BLOOD PRESSURE: 70 MMHG | TEMPERATURE: 98 F | HEART RATE: 82 BPM

## 2017-09-06 DIAGNOSIS — H10.022 PINK EYE, LEFT: Primary | ICD-10-CM

## 2017-09-06 PROCEDURE — 99213 OFFICE O/P EST LOW 20 MIN: CPT | Performed by: NURSE PRACTITIONER

## 2017-09-06 RX ORDER — POLYMYXIN B SULFATE AND TRIMETHOPRIM 1; 10000 MG/ML; [USP'U]/ML
SOLUTION OPHTHALMIC
Qty: 1 EACH | Refills: 0 | Status: SHIPPED | OUTPATIENT
Start: 2017-09-06 | End: 2018-01-23 | Stop reason: ALTCHOICE

## 2017-09-06 NOTE — PROGRESS NOTES
Subjective   Paradise Catherine is a 32 y.o. female.     Conjunctivitis    The current episode started 2 days ago (legally blind and non verbal ). The problem has been gradually worsening. Nothing relieves the symptoms. Associated symptoms include eye itching, eye discharge and eye redness. Pertinent negatives include no fever, no decreased vision, no photophobia, no sore throat and no eye pain.        The following portions of the patient's history were reviewed and updated as appropriate: allergies, current medications, past family history, past medical history, past social history, past surgical history and problem list.    Review of Systems   Constitutional: Negative for fever.   HENT: Negative for sore throat.    Eyes: Positive for discharge, redness and itching. Negative for photophobia and pain.        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    Cardiovascular: Negative.    Gastrointestinal: Negative.        Objective   Physical Exam   Constitutional: She appears well-developed and well-nourished.   HENT:   Right Ear: External ear normal.   Left Ear: External ear normal.   Eyes: Left eye exhibits discharge. Left eye exhibits no hordeolum. Left conjunctiva is injected. Left conjunctiva has no hemorrhage.   legally blind and non verbal. Not able to eye vision testing by snellen chart   Neck:   cerebral palsy   Cardiovascular: Normal rate and regular rhythm.    Pulmonary/Chest: Effort normal and breath sounds normal. She has no decreased breath sounds. She has no wheezes. She has no rhonchi. She has no rales.   Abdominal: Soft. Bowel sounds are normal.   Nursing note and vitals reviewed.      Assessment/Plan   Paradise was seen today for conjunctivitis.    Diagnoses and all orders for this visit:    Pink eye, left  -     trimethoprim-polymyxin b (POLYTRIM) 97481-2.1 UNIT/ML-% ophthalmic solution; Affected eye      Talked to the patient parents  about the  diagnosis and educate the patient and advise to visit to PCP if the symptoms worsens

## 2017-09-06 NOTE — PATIENT INSTRUCTIONS
Bacterial Conjunctivitis  Bacterial conjunctivitis is an infection of the clear membrane that covers the white part of your eye and the inner surface of your eyelid (conjunctiva). When the blood vessels in your conjunctiva become inflamed, your eye becomes red or pink, and it will probably feel itchy. Bacterial conjunctivitis spreads very easily from person to person (is contagious). It also spreads easily from one eye to the other eye.  CAUSES  This condition is caused by several common bacteria. You may get the infection if you come into close contact with another person who is infected. You may also come into contact with items that are contaminated with the bacteria, such as a face towel, contact lens solution, or eye makeup.  RISK FACTORS  This condition is more likely to develop in people who:  · Are exposed to other people who have the infection.  · Wear contact lenses.  · Have a sinus infection.  · Have had a recent eye injury or surgery.  · Have a weak body defense system (immune system).  · Have a medical condition that causes dry eyes.  SYMPTOMS  Symptoms of this condition include:  · Eye redness.  · Tearing or watery eyes.  · Itchy eyes.  · Burning feeling in your eyes.  · Thick, yellowish discharge from an eye. This may turn into a crust on the eyelid overnight and cause your eyelids to stick together.  · Swollen eyelids.  · Blurred vision.  DIAGNOSIS  Your health care provider can diagnose this condition based on your symptoms and medical history. Your health care provider may also take a sample of discharge from your eye to find the cause of your infection. This is rarely done.  TREATMENT  Treatment for this condition includes:  · Antibiotic eye drops or ointment to clear the infection more quickly and prevent the spread of infection to others.  · Oral antibiotic medicines to treat infections that do not respond to drops or ointments, or last longer than 10 days.  · Cool, wet cloths (cool compresses)  placed on the eyes.  · Artificial tears applied 2-6 times a day.  HOME CARE INSTRUCTIONS  Medicines  · Take or apply your antibiotic medicine as told by your health care provider. Do not stop taking or applying the antibiotic even if you start to feel better.  · Take or apply over-the-counter and prescription medicines only as told by your health care provider.  · Be very careful to avoid touching the edge of your eyelid with the eye drop bottle or the ointment tube when you apply medicines to the affected eye. This will keep you from spreading the infection to your other eye or to other people.  Managing Discomfort  · Gently wipe away any drainage from your eye with a warm, wet washcloth or a cotton ball.  · Apply a cool, clean washcloth to your eye for 10-20 minutes, 3-4 times a day.  General Instructions  · Do not wear contact lenses until the inflammation is gone and your health care provider says it is safe to wear them again. Ask your health care provider how to sterilize or replace your contact lenses before you use them again. Wear glasses until you can resume wearing contacts.  · Avoid wearing eye makeup until the inflammation is gone. Throw away any old eye cosmetics that may be contaminated.  · Change or wash your pillowcase every day.  · Do not share towels or washcloths. This may spread the infection.  · Wash your hands often with soap and water. Use paper towels to dry your hands.  · Avoid touching or rubbing your eyes.  · Do not drive or use heavy machinery if your vision is blurred.  SEEK MEDICAL CARE IF:  · You have a fever.  · Your symptoms do not get better after 10 days.  SEEK IMMEDIATE MEDICAL CARE IF:  · You have a fever and your symptoms suddenly get worse.  · You have severe pain when you move your eye.  · You have facial pain, redness, or swelling.  · You have sudden loss of vision.     This information is not intended to replace advice given to you by your health care provider. Make sure  you discuss any questions you have with your health care provider.     Document Released: 12/18/2006 Document Revised: 04/10/2017 Document Reviewed: 09/29/2016  [employer] Interactive Patient Education ©2017 [employer]  Talked to the patients parents  about the diagnosis and educate the patient and advise to visit to PCP if the symptoms worsens

## 2017-09-11 RX ORDER — INSULIN ASPART 100 [IU]/ML
INJECTION, SOLUTION INTRAVENOUS; SUBCUTANEOUS
Qty: 5 PEN | Refills: 10 | Status: SHIPPED | OUTPATIENT
Start: 2017-09-11 | End: 2018-02-19 | Stop reason: SDUPTHER

## 2017-09-12 DIAGNOSIS — E10.8 TYPE 1 DIABETES MELLITUS WITH COMPLICATIONS (HCC): ICD-10-CM

## 2017-09-12 RX ORDER — BLOOD SUGAR DIAGNOSTIC
STRIP MISCELLANEOUS
Qty: 200 EACH | Refills: 4 | Status: SHIPPED | OUTPATIENT
Start: 2017-09-12 | End: 2018-04-08 | Stop reason: SDUPTHER

## 2017-09-20 DIAGNOSIS — E10.8 TYPE 1 DIABETES MELLITUS WITH COMPLICATION (HCC): Primary | ICD-10-CM

## 2017-10-17 ENCOUNTER — OFFICE VISIT (OUTPATIENT)
Dept: ENDOCRINOLOGY | Age: 32
End: 2017-10-17

## 2017-10-17 VITALS
HEART RATE: 80 BPM | BODY MASS INDEX: 18.84 KG/M2 | SYSTOLIC BLOOD PRESSURE: 110 MMHG | WEIGHT: 102.4 LBS | DIASTOLIC BLOOD PRESSURE: 78 MMHG | HEIGHT: 62 IN

## 2017-10-17 DIAGNOSIS — E16.1 HYPERINSULINISM: ICD-10-CM

## 2017-10-17 DIAGNOSIS — Z79.4 ENCOUNTER FOR LONG-TERM (CURRENT) USE OF INSULIN (HCC): ICD-10-CM

## 2017-10-17 DIAGNOSIS — G40.909 SEIZURE DISORDER (HCC): ICD-10-CM

## 2017-10-17 DIAGNOSIS — IMO0002 DIABETES MELLITUS TYPE 1, UNCONTROLLED, WITH COMPLICATIONS: ICD-10-CM

## 2017-10-17 DIAGNOSIS — F79 MENTAL RETARDATION: ICD-10-CM

## 2017-10-17 DIAGNOSIS — E55.9 VITAMIN D DEFICIENCY: Primary | ICD-10-CM

## 2017-10-17 PROCEDURE — 99214 OFFICE O/P EST MOD 30 MIN: CPT | Performed by: INTERNAL MEDICINE

## 2017-10-17 NOTE — PROGRESS NOTES
32 y.o.    Patient Care Team:  Drew Blanchard MD as PCP - General (Family Medicine)  Angel Roque MD as PCP - Family Medicine  Giovanni MORGAN MD as Consulting Physician (Endocrinology)  Mickey Vyas Jr., MD as Consulting Physician (Neurology)  Migue Echavarria MD as Consulting Physician (Allergy)    Chief Complaint:        F/U TYPE 1 DIABETES, UNCONTROLLED.  NO RECENT LABS.    Subjective     HPI patient is a 32-year-old female with a history of severe mental retardation and seizure disorder 7 behavioral problems as well as uncontrolled type 1 diabetes mellitus since age 18 came for follow-up  Patient is accompanied by both her parents or caregivers    Patient is mostly on, indicated  Glucose log reveals that most of the blood sugars are in the  range at home but when she goes to  her blood sugars seem to be higher in the 250-400 range  Patient is currently receiving Levemir 28 units in the morning and 5-8 units at night  She gets NovoLog 3-4 units before each meal  Patient's blood sugars at the  center before lunch are usually the highest  There is no known history of diabetic retinopathy or nephropathy or neuropathy  Vitamin D deficiency  Patient is currently taking vitamin D3 daily at dinnertime  She also periodically drinks Glucerna  But the parents report that her appetite has improved and she is eating much better in the past several months.        Interval History   Patient was brought in by both parents who are the primary historians since patient is noncommunicative.  They reported that patient was diabetic for the past 10 years, has been on different insulin regimens, a few years ago started Levemir and NovoLog, at that time they were seeing an endocrinologist in [place].  She gets 29 units of l Levemir in the morning and 5 units at night, NovoLog 1 unit for every 15 carbs and 1 unit for every 30-50 mg a blood sugar over 150 for correction.  They reported  usual hypoglycemia in the early morning hours, and higher blood sugars in the 200-250 range at dinnertime or suppertime. Patient apparently spends most of the day at the , and sometimes she does not get NovoLog for lunch at the . Apparently the staff decide to give insulin or not give insulin based on patient's behavior and blood sugar.  both parents denied any knowledge of diabetic retinopathy, nephropathy, neuropathy   Does not denied any knowledge of thyroid disorder  Mother is trying to get her eye examination done sometime this year  Patient is mostly nonverbal, gives only a momentary eye contact.  According to the parents patient has done relatively well since her last adjustment of insulin dose  Most of the blood sugars have been less than 200 at home. The  center did not send any Accu-Chek log this time.   Parents report that patient developed a rash with severe itching over the past several months and has been on prednisone on and off. Currently she is taking 10 mg prednisone taper and will probably on this medication for several weeks. Patient's blood sugars have been elevated in the 200-300 range and they have been increasing the insulin and gradually.  She is already seeing the dermatologist and 2 allergy specialists with no clear etiology identified. Medication list was reviewed but did not seem specifically connected to the rash.  Interval history  Mother reported that since they decided Levemir and NovoLog were not causing the rash as per the allergist she started going back on these medications. She has been on prednisone for the past several months and her blood sugars have been in the 200-400 range. They started tapering the prednisone and currently she is taking 5 mg daily and will soon start taking 5 mg every other day.  The following portions of the patient's history were reviewed and updated as appropriate: allergies, current medications, past family history, past medical  history, past social history, past surgical history and problem list.    Past Medical History:   Diagnosis Date   • Cerebral palsy     birth   • Diabetes mellitus    • Diabetes mellitus type I    • Hives     chronic   • Hives    • Seizures      Family History   Problem Relation Age of Onset   • Hypertension Mother    • Hyperlipidemia Mother    • Seasonal affective disorder Father      Social History     Social History   • Marital status: Single     Spouse name: N/A   • Number of children: N/A   • Years of education: N/A     Occupational History   • Not on file.     Social History Main Topics   • Smoking status: Never Smoker   • Smokeless tobacco: Not on file   • Alcohol use No   • Drug use: Not on file   • Sexual activity: No     Other Topics Concern   • Not on file     Social History Narrative     No Known Allergies    Current Outpatient Prescriptions:   •  acetaminophen (TYLENOL) 325 MG tablet, Take 1 tablet by mouth Every 6 (Six) Hours As Needed for Mild Pain (1-3)., Disp: 30 tablet, Rfl: 11  •  Cholecalciferol 2000 UNITS capsule, Take  by mouth., Disp: , Rfl:   •  Diapers & Supplies misc, Depends pull-ups in small/medium.  Use as needed.  Needs 2 cases every other month., Disp: 2 each, Rfl: 5  •  diphenhydrAMINE (BENADRYL) 25 MG tablet, Take 1 tablet by mouth Every 6 (Six) Hours As Needed for Itching., Disp: 30 tablet, Rfl: 11  •  FLUoxetine (PROzac) 20 MG capsule, TAKE ONE CAPSULE BY MOUTH DAILY, Disp: 30 capsule, Rfl: 10  •  glucagon (GLUCAGEN) 1 MG injection, Inject 1 mg into the shoulder, thigh, or buttocks 1 (One) Time As Needed for low blood sugar for up to 1 dose., Disp: 2 kit, Rfl: 6  •  Insulin Lispro (HUMALOG KWIKPEN) 100 UNIT/ML solution pen-injector, INJECT 8 UNITS UNDER THE SKIN THREE TIMES A DAY BEFORE MEALS AS DIRECTED, Disp: 5 pen, Rfl: 11  •  Insulin Pen Needle (PEN NEEDLES) 32G X 4 MM misc, USE 5 TIMES A DAY, Disp: 100 each, Rfl: 4  •  LEVEMIR FLEXTOUCH 100 UNIT/ML injection, INJECT 40 UNITS  "UNDER THE SKIN EVERY NIGHT, Disp: 5 pen, Rfl: 10  •  levocetirizine (XYZAL) 5 MG tablet, Take 5 mg by mouth every evening., Disp: , Rfl:   •  NOVOLOG FLEXPEN 100 UNIT/ML solution pen-injector sc pen, INJECT 8 UNITS UNDER THE SKIN THREE TIMES A DAY BEFORE MEALS AS DIRECTED, Disp: 5 pen, Rfl: 10  •  omalizumab (XOLAIR) 150 MG injection, Inject 300 mg under the skin 1 (one) time., Disp: , Rfl:   •  ONETOUCH VERIO test strip, USE 5 TIMES A DAY, Disp: 200 each, Rfl: 4  •  OXcarbazepine (TRILEPTAL) 600 MG tablet, Take 1 tablet by mouth 2 (Two) Times a Day. Take 1 1/2 tab bid, Disp: 90 tablet, Rfl: 11  •  SUNSCREEN SPF50 lotion, Apply generously every 2 hours while outside., Disp: 1 bottle, Rfl: 11  •  topiramate (TOPAMAX) 25 MG tablet, Take 3 tablets by mouth 2 (Two) Times a Day., Disp: 180 tablet, Rfl: 11  •  trimethoprim-polymyxin b (POLYTRIM) 21219-8.1 UNIT/ML-% ophthalmic solution, Affected eye, Disp: 1 each, Rfl: 0        Review of Systems   Constitutional: Negative for chills, fatigue and fever.   Cardiovascular: Negative for chest pain and palpitations.   Gastrointestinal: Negative for abdominal pain, constipation, diarrhea, nausea and vomiting.   Endocrine: Negative for cold intolerance and heat intolerance.   All other systems reviewed and are negative.      Objective       Vitals:    10/17/17 1510   BP: 110/78   Pulse: 80   Weight: 102 lb 6.4 oz (46.4 kg)   Height: 62\" (157.5 cm)     Body mass index is 18.73 kg/(m^2).      Physical Exam   Eyes: EOM are normal. Pupils are equal, round, and reactive to light.   Neck: Normal range of motion. Neck supple. No thyromegaly present.   Cardiovascular: Normal rate, regular rhythm, normal heart sounds and intact distal pulses.    Pulmonary/Chest: Effort normal and breath sounds normal.   Abdominal: Soft. Bowel sounds are normal. She exhibits no distension. There is no tenderness.   Musculoskeletal: Normal range of motion. She exhibits no edema.   Neurological: She is " alert.   Skin: Skin is warm and dry.   Psychiatric: Her behavior is normal.   Nursing note and vitals reviewed.    Results Review:     I reviewed the patient's new clinical results.    Medical records reviewed  Summary:      Office Visit on 05/30/2017   Component Date Value Ref Range Status   • Glucose 05/30/2017 236* 65 - 99 mg/dL Final   • BUN 05/30/2017 16  6 - 20 mg/dL Final   • Creatinine 05/30/2017 0.68  0.57 - 1.00 mg/dL Final   • eGFR Non African Am 05/30/2017 101  >60 mL/min/1.73 Final   • eGFR African Am 05/30/2017 122  >60 mL/min/1.73 Final   • BUN/Creatinine Ratio 05/30/2017 23.5  7.0 - 25.0 Final   • Sodium 05/30/2017 138  136 - 145 mmol/L Final   • Potassium 05/30/2017 4.4  3.5 - 5.2 mmol/L Final   • Chloride 05/30/2017 102  98 - 107 mmol/L Final   • Total CO2 05/30/2017 24.7  22.0 - 29.0 mmol/L Final   • Calcium 05/30/2017 9.2  8.6 - 10.5 mg/dL Final   • Total Protein 05/30/2017 7.2  6.0 - 8.5 g/dL Final   • Albumin 05/30/2017 4.10  3.50 - 5.20 g/dL Final   • Globulin 05/30/2017 3.1  gm/dL Final   • A/G Ratio 05/30/2017 1.3  g/dL Final   • Total Bilirubin 05/30/2017 0.2  0.1 - 1.2 mg/dL Final   • Alkaline Phosphatase 05/30/2017 90  39 - 117 U/L Final   • AST (SGOT) 05/30/2017 22  1 - 32 U/L Final   • ALT (SGPT) 05/30/2017 22  1 - 33 U/L Final   • Hemoglobin A1C 05/30/2017 6.25* 4.80 - 5.60 % Final    Comment: Hemoglobin A1C Ranges:  Increased Risk for Diabetes  5.7% to 6.4%  Diabetes                     >= 6.5%  Diabetic Goal                < 7.0%     • TSH 05/30/2017 2.000  0.270 - 4.200 mIU/mL Final   • 25 Hydroxy, Vitamin D 05/30/2017 71.5  30.0 - 100.0 ng/mL Final    Comment: Vitamin D deficiency has been defined by the [place] of  [employer] and an Endocrine Society practice guideline as a  level of serum 25-OH vitamin D less than 20 ng/mL (1,2).  The Endocrine Society went on to further define vitamin D  insufficiency as a level between 21 and 29 ng/mL (2).  1. IOM ([place] of [employer]). 2010.  Dietary reference     intakes for calcium and D. [place]: The     National Academies Press.  2. Tiffanie MF, Elver NC, Regina RYAN, et al.     Evaluation, treatment, and prevention of vitamin D     deficiency: an Endocrine Society clinical practice     guideline. JCEM. 2011 Jul; 96(6):1911-30.     • WBC 05/30/2017 3.25* 4.50 - 10.70 10*3/mm3 Final   • RBC 05/30/2017 3.85* 3.90 - 5.20 10*6/mm3 Final   • Hemoglobin 05/30/2017 11.9  11.9 - 15.5 g/dL Final   • Hematocrit 05/30/2017 36.2  35.6 - 45.5 % Final   • MCV 05/30/2017 94.0  80.5 - 98.2 fL Final   • MCH 05/30/2017 30.9  26.9 - 32.0 pg Final   • MCHC 05/30/2017 32.9  32.4 - 36.3 g/dL Final   • RDW 05/30/2017 12.4  11.7 - 13.0 % Final   • Platelets 05/30/2017 245  140 - 500 10*3/mm3 Final   • Neutrophil Rel % 05/30/2017 52.3  42.7 - 76.0 % Final   • Lymphocyte Rel % 05/30/2017 36.9  19.6 - 45.3 % Final   • Monocyte Rel % 05/30/2017 10.5  5.0 - 12.0 % Final   • Eosinophil Rel % 05/30/2017 0.0* 0.3 - 6.2 % Final   • Basophil Rel % 05/30/2017 0.3  0.0 - 1.5 % Final   • Neutrophils Absolute 05/30/2017 1.70* 1.90 - 8.10 10*3/mm3 Final   • Lymphocytes Absolute 05/30/2017 1.20  0.90 - 4.80 10*3/mm3 Final   • Monocytes Absolute 05/30/2017 0.34  0.20 - 1.20 10*3/mm3 Final   • Eosinophils Absolute 05/30/2017 0.00  0.00 - 0.70 10*3/mm3 Final   • Basophils Absolute 05/30/2017 0.01  0.00 - 0.20 10*3/mm3 Final   • Immature Granulocyte Rel % 05/30/2017 0.0  0.0 - 0.5 % Final   • Immature Grans Absolute 05/30/2017 0.00  0.00 - 0.03 10*3/mm3 Final   • Unable to Void 05/30/2017 Comment   Final    Comment: The patient was not able to render a urine sample and has been  instructed to return for a urine collection at their earliest  convenience.  The urine testing that you have requested has  been deleted from this report.  When the patient returns and  provides a urine specimen, the urine testing will be performed  and separately reported.       Lab Results   Component  Value Date    HGBA1C 6.25 (H) 05/30/2017    HGBA1C 5.71 (H) 01/17/2017    HGBA1C 6.00 (H) 07/18/2016     Lab Results   Component Value Date    CREATININE 0.68 05/30/2017     Imaging Results (most recent)     None                Assessment and Plan:    Paradise was seen today for diabetes.    Diagnoses and all orders for this visit:    Vitamin D deficiency  -     Comprehensive Metabolic Panel  -     Hemoglobin A1c  -     TSH  -     Microalbumin / Creatinine Urine Ratio - Urine, Clean Catch  -     Vitamin D 25 Hydroxy  -     Fructosamine    Diabetes mellitus type 1, uncontrolled, with complications  -     Fructosamine    Hyperinsulinism  -     Fructosamine    Mental retardation  -     Fructosamine    Seizure disorder  -     Fructosamine    Encounter for long-term (current) use of insulin  -     Fructosamine        #1 uncontrolled type I diabetes mellitus with the last hemoglobin A1c was 6.9%   #2 hypoglycemia  #3 seizure disorder currently on medication but apparently hypoglycemia is the trigger, and they are trying to avoid that.  #4 vitamin D deficiency patient is currently taking 1000 units daily     Patient's glucometer download reviewed  Most of the blood sugars are ranging from   Patient's glucometer at the  center has numbers in the 250-400 range    Patient will continue the Levemir to 29 units in the morning and 5-9 units at night  Patient is getting 3-4 units of NovoLog before each meal  I recommend that the family might increase it to 4-5 units especially at breakfast so that her blood sugar at  might be in the 204 100  Family verbalized understanding    Patient will get lab testing done today  Her hemoglobin A1c has always been excellent and generally does not validate the Accu-Chek values which are seemingly too high for this hemoglobin A1c  I will check fructosamine levels and see if that will correlate better with the day-to-day Accu-Cheks    Patient will return to follow-up in 3-4  "months.     The total time spent for old record and lab review and face- to- face was more than 25 min of which greater than 15 min of time was spent on counseling the patient on recommended evaluation and treatment options, instructions for management/treatment and /or follow up  and importance of compliance with chosen management or treatment options      Giovanni Lewis MD. FACE    10/17/17      EMR Dragon / transcription disclaimer:     \"Dictated utilizing Dragon dictation\".         "

## 2017-10-18 LAB
25(OH)D3+25(OH)D2 SERPL-MCNC: 88.1 NG/ML (ref 30–100)
ALBUMIN SERPL-MCNC: 4.5 G/DL (ref 3.5–5.2)
ALBUMIN/GLOB SERPL: 1.4 G/DL
ALP SERPL-CCNC: 102 U/L (ref 39–117)
ALT SERPL-CCNC: 27 U/L (ref 1–33)
AST SERPL-CCNC: 21 U/L (ref 1–32)
BILIRUB SERPL-MCNC: <0.2 MG/DL (ref 0.1–1.2)
BUN SERPL-MCNC: 16 MG/DL (ref 6–20)
BUN/CREAT SERPL: 20.8 (ref 7–25)
CALCIUM SERPL-MCNC: 9.3 MG/DL (ref 8.6–10.5)
CHLORIDE SERPL-SCNC: 103 MMOL/L (ref 98–107)
CO2 SERPL-SCNC: 23.4 MMOL/L (ref 22–29)
CREAT SERPL-MCNC: 0.77 MG/DL (ref 0.57–1)
FRUCTOSAMINE SERPL-SCNC: 360 UMOL/L (ref 0–285)
GLOBULIN SER CALC-MCNC: 3.2 GM/DL
GLUCOSE SERPL-MCNC: 245 MG/DL (ref 65–99)
HBA1C MFR BLD: 6.5 % (ref 4.8–5.6)
POTASSIUM SERPL-SCNC: 3.8 MMOL/L (ref 3.5–5.2)
PROT SERPL-MCNC: 7.7 G/DL (ref 6–8.5)
SODIUM SERPL-SCNC: 141 MMOL/L (ref 136–145)
TSH SERPL DL<=0.005 MIU/L-ACNC: 1.61 MIU/ML (ref 0.27–4.2)
UNABLE TO VOID: NORMAL

## 2017-11-12 DIAGNOSIS — E10.8 TYPE 1 DIABETES MELLITUS WITH COMPLICATIONS (HCC): ICD-10-CM

## 2017-11-13 RX ORDER — PEN NEEDLE, DIABETIC 30 GX3/16"
NEEDLE, DISPOSABLE MISCELLANEOUS
Qty: 100 EACH | Refills: 3 | Status: SHIPPED | OUTPATIENT
Start: 2017-11-13 | End: 2018-02-28 | Stop reason: SDUPTHER

## 2018-01-17 RX ORDER — DIAZEPAM 20 MG/4ML
GEL RECTAL
Qty: 20 MG | Refills: 4 | Status: SHIPPED | OUTPATIENT
Start: 2018-01-17 | End: 2018-01-23 | Stop reason: SDUPTHER

## 2018-01-18 RX ORDER — DIAZEPAM 20 MG/4ML
GEL RECTAL
Refills: 4 | OUTPATIENT
Start: 2018-01-18

## 2018-01-23 ENCOUNTER — OFFICE VISIT (OUTPATIENT)
Dept: NEUROLOGY | Facility: CLINIC | Age: 33
End: 2018-01-23

## 2018-01-23 VITALS — BODY MASS INDEX: 19.32 KG/M2 | HEIGHT: 62 IN | WEIGHT: 105 LBS

## 2018-01-23 DIAGNOSIS — G80.1 SPASTIC DIPLEGIC CEREBRAL PALSY (HCC): Primary | ICD-10-CM

## 2018-01-23 DIAGNOSIS — G40.909 SEIZURE DISORDER (HCC): ICD-10-CM

## 2018-01-23 PROCEDURE — 99213 OFFICE O/P EST LOW 20 MIN: CPT | Performed by: PSYCHIATRY & NEUROLOGY

## 2018-01-23 RX ORDER — DIAZEPAM 20 MG/4ML
20 GEL RECTAL ONCE
Qty: 20 MG | Refills: 4 | Status: SHIPPED | OUTPATIENT
Start: 2018-01-23 | End: 2018-01-23

## 2018-01-23 RX ORDER — TOPIRAMATE 25 MG/1
75 TABLET ORAL 2 TIMES DAILY
Qty: 180 TABLET | Refills: 11 | Status: SHIPPED | OUTPATIENT
Start: 2018-01-23 | End: 2018-09-20 | Stop reason: CLARIF

## 2018-01-23 RX ORDER — OXCARBAZEPINE 600 MG/1
600 TABLET, FILM COATED ORAL 2 TIMES DAILY
Qty: 90 TABLET | Refills: 11 | Status: SHIPPED | OUTPATIENT
Start: 2018-01-23 | End: 2019-01-13 | Stop reason: SDUPTHER

## 2018-01-23 NOTE — PROGRESS NOTES
Subjective:     Patient ID: Paradise Catherine is a 32 y.o. female.    History of Present Illness     Patient will have a 36 seizure about once a week these are very brief these mostly occur during sleep she is used Diastat one time since her last visit here.  Medicines were reviewed.  History was taken from the parents  The following portions of the patient's history were reviewed and updated as appropriate: allergies, current medications, past family history, past medical history, past social history, past surgical history and problem list.      Current Outpatient Prescriptions:   •  acetaminophen (TYLENOL) 325 MG tablet, Take 1 tablet by mouth Every 6 (Six) Hours As Needed for Mild Pain (1-3)., Disp: 30 tablet, Rfl: 11  •  Cholecalciferol 2000 UNITS capsule, Take  by mouth., Disp: , Rfl:   •  Diapers & Supplies misc, Depends pull-ups in small/medium.  Use as needed.  Needs 2 cases every other month., Disp: 2 each, Rfl: 5  •  diazepam (DIASTAT ACUDIAL) 20 MG rectal kit, Insert 20 mg into the rectum 1 (One) Time for 1 dose., Disp: 20 mg, Rfl: 4  •  diphenhydrAMINE (BENADRYL) 25 MG tablet, Take 1 tablet by mouth Every 6 (Six) Hours As Needed for Itching., Disp: 30 tablet, Rfl: 11  •  FLUoxetine (PROzac) 20 MG capsule, TAKE ONE CAPSULE BY MOUTH DAILY, Disp: 30 capsule, Rfl: 10  •  glucagon (GLUCAGEN) 1 MG injection, Inject 1 mg into the shoulder, thigh, or buttocks 1 (One) Time As Needed for low blood sugar for up to 1 dose., Disp: 2 kit, Rfl: 6  •  Insulin Pen Needle (PEN NEEDLES) 32G X 4 MM misc, USE 5 TIMES DAILY, Disp: 100 each, Rfl: 3  •  LEVEMIR FLEXTOUCH 100 UNIT/ML injection, INJECT 40 UNITS UNDER THE SKIN EVERY NIGHT, Disp: 5 pen, Rfl: 10  •  levocetirizine (XYZAL) 5 MG tablet, Take 5 mg by mouth every evening., Disp: , Rfl:   •  NOVOLOG FLEXPEN 100 UNIT/ML solution pen-injector sc pen, INJECT 8 UNITS UNDER THE SKIN THREE TIMES A DAY BEFORE MEALS AS DIRECTED, Disp: 5 pen, Rfl: 10  •  omalizumab (XOLAIR) 150 MG  injection, Inject 300 mg under the skin 1 (one) time., Disp: , Rfl:   •  ONETOUCH VERIO test strip, USE 5 TIMES A DAY, Disp: 200 each, Rfl: 4  •  OXcarbazepine (TRILEPTAL) 600 MG tablet, Take 1 tablet by mouth 2 (Two) Times a Day. Take 1 1/2 tab bid, Disp: 90 tablet, Rfl: 11  •  SUNSCREEN SPF50 lotion, Apply generously every 2 hours while outside., Disp: 1 bottle, Rfl: 11  •  topiramate (TOPAMAX) 25 MG tablet, Take 3 tablets by mouth 2 (Two) Times a Day., Disp: 180 tablet, Rfl: 11      Review of Systems   Constitutional: Negative.    Neurological: Positive for seizures. Negative for dizziness, tremors, syncope, facial asymmetry, speech difficulty, weakness, light-headedness, numbness and headaches.   Psychiatric/Behavioral: Negative.         Objective:    Neurologic Exam  The patient is mentally handicapped.   Funduscopy, eye movements and pupillary reflexes were normal.  Visual fields were symmetric to opticokinetic nystagmus tape  No facial weakness noted.  Has a spastic diplegia and is reflected in her gait.  Physical Exam    Assessment/Plan:     Paradise was seen today for seizures.    Diagnoses and all orders for this visit:    Spastic diplegic cerebral palsy    Seizure disorder    Other orders  -     OXcarbazepine (TRILEPTAL) 600 MG tablet; Take 1 tablet by mouth 2 (Two) Times a Day. Take 1 1/2 tab bid  -     topiramate (TOPAMAX) 25 MG tablet; Take 3 tablets by mouth 2 (Two) Times a Day.  -     diazepam (DIASTAT ACUDIAL) 20 MG rectal kit; Insert 20 mg into the rectum 1 (One) Time for 1 dose.       Prescription drug management - meds as above    Follow-up in the office in one year. Thank you for allowing me to share in the care of this patient.  Mickey Vyas M.D.

## 2018-02-19 ENCOUNTER — OFFICE VISIT (OUTPATIENT)
Dept: ENDOCRINOLOGY | Age: 33
End: 2018-02-19

## 2018-02-19 DIAGNOSIS — F79 MENTAL RETARDATION: ICD-10-CM

## 2018-02-19 DIAGNOSIS — E55.9 VITAMIN D DEFICIENCY: ICD-10-CM

## 2018-02-19 DIAGNOSIS — Z79.4 ENCOUNTER FOR LONG-TERM (CURRENT) USE OF INSULIN (HCC): ICD-10-CM

## 2018-02-19 DIAGNOSIS — E16.1 HYPERINSULINISM: ICD-10-CM

## 2018-02-19 DIAGNOSIS — G40.909 SEIZURE DISORDER (HCC): ICD-10-CM

## 2018-02-19 DIAGNOSIS — IMO0002 DIABETES MELLITUS TYPE 1, UNCONTROLLED, WITH COMPLICATIONS: ICD-10-CM

## 2018-02-19 DIAGNOSIS — E10.8 TYPE 1 DIABETES MELLITUS WITH COMPLICATIONS (HCC): Primary | ICD-10-CM

## 2018-02-19 PROCEDURE — 99214 OFFICE O/P EST MOD 30 MIN: CPT | Performed by: INTERNAL MEDICINE

## 2018-02-20 LAB
25(OH)D3+25(OH)D2 SERPL-MCNC: 79.7 NG/ML (ref 30–100)
ALBUMIN SERPL-MCNC: 4.3 G/DL (ref 3.5–5.2)
ALBUMIN/GLOB SERPL: 1.4 G/DL
ALP SERPL-CCNC: 88 U/L (ref 39–117)
ALT SERPL-CCNC: 29 U/L (ref 1–33)
AST SERPL-CCNC: 23 U/L (ref 1–32)
BILIRUB SERPL-MCNC: <0.2 MG/DL (ref 0.1–1.2)
BUN SERPL-MCNC: 17 MG/DL (ref 6–20)
BUN/CREAT SERPL: 22.4 (ref 7–25)
CALCIUM SERPL-MCNC: 8.8 MG/DL (ref 8.6–10.5)
CHLORIDE SERPL-SCNC: 102 MMOL/L (ref 98–107)
CO2 SERPL-SCNC: 24.3 MMOL/L (ref 22–29)
CREAT SERPL-MCNC: 0.76 MG/DL (ref 0.57–1)
FRUCTOSAMINE SERPL-SCNC: 386 UMOL/L (ref 0–285)
GFR SERPLBLD CREATININE-BSD FMLA CKD-EPI: 108 ML/MIN/1.73
GFR SERPLBLD CREATININE-BSD FMLA CKD-EPI: 89 ML/MIN/1.73
GLOBULIN SER CALC-MCNC: 3 GM/DL
GLUCOSE SERPL-MCNC: 200 MG/DL (ref 65–99)
HBA1C MFR BLD: 6.4 % (ref 4.8–5.6)
POTASSIUM SERPL-SCNC: 3.8 MMOL/L (ref 3.5–5.2)
PROT SERPL-MCNC: 7.3 G/DL (ref 6–8.5)
SODIUM SERPL-SCNC: 139 MMOL/L (ref 136–145)
TSH SERPL DL<=0.005 MIU/L-ACNC: 2.2 MIU/ML (ref 0.27–4.2)

## 2018-02-28 DIAGNOSIS — E10.8 TYPE 1 DIABETES MELLITUS WITH COMPLICATIONS (HCC): ICD-10-CM

## 2018-03-01 RX ORDER — PEN NEEDLE, DIABETIC 30 GX3/16"
NEEDLE, DISPOSABLE MISCELLANEOUS
Qty: 100 EACH | Refills: 5 | Status: SHIPPED | OUTPATIENT
Start: 2018-03-01 | End: 2018-07-26 | Stop reason: SDUPTHER

## 2018-03-12 RX ORDER — FLUOXETINE HYDROCHLORIDE 20 MG/1
CAPSULE ORAL
Qty: 30 CAPSULE | Refills: 5 | Status: SHIPPED | OUTPATIENT
Start: 2018-03-12 | End: 2018-05-14 | Stop reason: SDUPTHER

## 2018-03-26 ENCOUNTER — PRIOR AUTHORIZATION (OUTPATIENT)
Dept: ENDOCRINOLOGY | Age: 33
End: 2018-03-26

## 2018-04-08 DIAGNOSIS — E10.8 TYPE 1 DIABETES MELLITUS WITH COMPLICATIONS (HCC): ICD-10-CM

## 2018-04-09 RX ORDER — BLOOD SUGAR DIAGNOSTIC
STRIP MISCELLANEOUS
Qty: 200 EACH | Refills: 3 | Status: SHIPPED | OUTPATIENT
Start: 2018-04-09 | End: 2018-05-23 | Stop reason: SDUPTHER

## 2018-05-14 ENCOUNTER — OFFICE VISIT (OUTPATIENT)
Dept: INTERNAL MEDICINE | Facility: CLINIC | Age: 33
End: 2018-05-14

## 2018-05-14 VITALS
OXYGEN SATURATION: 98 % | BODY MASS INDEX: 18.29 KG/M2 | HEART RATE: 52 BPM | SYSTOLIC BLOOD PRESSURE: 102 MMHG | TEMPERATURE: 98.8 F | WEIGHT: 100 LBS | DIASTOLIC BLOOD PRESSURE: 64 MMHG

## 2018-05-14 DIAGNOSIS — Z00.00 ANNUAL PHYSICAL EXAM: Primary | ICD-10-CM

## 2018-05-14 PROCEDURE — 99395 PREV VISIT EST AGE 18-39: CPT | Performed by: FAMILY MEDICINE

## 2018-05-14 PROCEDURE — 90632 HEPA VACCINE ADULT IM: CPT | Performed by: FAMILY MEDICINE

## 2018-05-14 PROCEDURE — 90471 IMMUNIZATION ADMIN: CPT | Performed by: FAMILY MEDICINE

## 2018-05-14 RX ORDER — ACETAMINOPHEN 325 MG/1
325 TABLET ORAL EVERY 6 HOURS PRN
Qty: 30 TABLET | Refills: 11 | Status: SHIPPED | OUTPATIENT
Start: 2018-05-14 | End: 2019-05-16 | Stop reason: SDUPTHER

## 2018-05-14 RX ORDER — FLUOXETINE HYDROCHLORIDE 20 MG/1
20 CAPSULE ORAL DAILY
Qty: 30 CAPSULE | Refills: 11 | Status: SHIPPED | OUTPATIENT
Start: 2018-05-14 | End: 2019-05-20 | Stop reason: SDUPTHER

## 2018-05-14 RX ORDER — DIPHENHYDRAMINE HCL 25 MG
25 TABLET ORAL EVERY 6 HOURS PRN
Qty: 30 TABLET | Refills: 11 | Status: SHIPPED | OUTPATIENT
Start: 2018-05-14 | End: 2019-05-16 | Stop reason: SDUPTHER

## 2018-05-14 NOTE — PROGRESS NOTES
Patient Name: Paradise Catherine    SUBJECTIVE    Paradise is a 33 y.o. female presenting for Annual Exam      Well Adult Physical   Patient here for a comprehensive physical exam.The patient reports no problems    Do you take any herbs or supplements that were not prescribed by a doctor? no   Are you taking calcium supplements? no   Are you taking aspirin daily? no     History:  No history of sexual activity.    Paradise Catherine 33 y.o. female who presents for an Annual Wellness Visit.  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  she has been doing well with new interval problems.      Health Habits:  Dental Exam. up to date [facility]  Eye Exam. [facility]  Exercise:  Current exercise activities include: OT and ST      The following portions of the patient's history were reviewed and updated as appropriate: allergies, current medications, past family history, past medical history, past social history, past surgical history and problem list.    Review of Systems   Constitutional: Negative.    HENT: Negative.    Eyes: Negative.    Respiratory: Negative.    Cardiovascular: Negative.    Gastrointestinal: Positive for constipation.   Endocrine: Negative.    Genitourinary: Negative.    Musculoskeletal: Negative.    Skin: Negative.    Allergic/Immunologic: Negative.    Neurological: Positive for seizures.   Hematological: Negative.    Psychiatric/Behavioral: Positive for behavioral  problems.       Review of patient's allergies indicates no known allergies.      Current Outpatient Prescriptions:   •  acetaminophen (TYLENOL) 325 MG tablet, Take 1 tablet by mouth Every 6 (Six) Hours As Needed for Mild Pain ., Disp: 30 tablet, Rfl: 11  •  Diapers & Supplies misc, Depends pull-ups in small/medium.  Use as needed.  Needs 2 cases every other month., Disp: 2 each, Rfl: 5  •  diphenhydrAMINE (BENADRYL) 25 MG tablet, Take 1 tablet by mouth Every 6 (Six) Hours As Needed for Itching., Disp: 30 tablet, Rfl: 11  •  FLUoxetine (PROzac) 20 MG capsule, Take 1 capsule by mouth Daily., Disp: 30 capsule, Rfl: 11  •  glucagon (GLUCAGEN) 1 MG injection, Inject 1 mg into the shoulder, thigh, or buttocks 1 (One) Time As Needed for Low Blood Sugar for up to 1 dose., Disp: 2 kit, Rfl: 6  •  insulin aspart (NOVOLOG FLEXPEN) 100 UNIT/ML solution pen-injector sc pen, 8 UNITS TIDAC SC, Disp: 5 pen, Rfl: 10  •  insulin detemir (LEVEMIR FLEXTOUCH) 100 UNIT/ML injection, 20 UNITS TWICE DAILY, Disp: 5 pen, Rfl: 10  •  Insulin Pen Needle (PEN NEEDLES) 32G X 4 MM misc, USE TO TEST 5 TIMES DAILY, Disp: 100 each, Rfl: 5  •  levocetirizine (XYZAL) 5 MG tablet, Take 5 mg by mouth every evening., Disp: , Rfl:   •  omalizumab (XOLAIR) 150 MG injection, Inject 300 mg under the skin 1 (one) time., Disp: , Rfl:   •  ONETOUCH VERIO test strip, TEST BLOOD SUGAR 5 TIMES DAILY, Disp: 200 each, Rfl: 3  •  OXcarbazepine (TRILEPTAL) 600 MG tablet, Take 1 tablet by mouth 2 (Two) Times a Day. Take 1 1/2 tab bid, Disp: 90 tablet, Rfl: 11  •  SUNSCREEN SPF50 lotion, Apply generously every 2 hours while outside., Disp: 1 bottle, Rfl: 11  •  topiramate (TOPAMAX) 25 MG tablet, Take 3 tablets by mouth 2 (Two) Times a Day., Disp: 180 tablet, Rfl: 11  •  Cholecalciferol 2000 UNITS capsule, Take 5,000 Units by mouth., Disp: , Rfl:     OBJECTIVE    /64   Pulse 52   Temp 98.8 °F (37.1 °C)   Wt 45.4 kg (100 lb)   LMP 05/11/2018   SpO2 98%   BMI  18.29 kg/m²     Physical Exam   Constitutional: She appears well-developed and well-nourished.   HENT:   Head: Normocephalic and atraumatic.   Right Ear: External ear normal.   Left Ear: External ear normal.   Nose: Nose normal.   Mouth/Throat: Oropharynx is clear and moist.   Eyes: Conjunctivae and EOM are normal. Pupils are equal, round, and reactive to light. No scleral icterus.   Neck: Normal range of motion. Neck supple. No thyromegaly present.   Cardiovascular: Normal rate, regular rhythm, normal heart sounds and intact distal pulses.  Exam reveals no gallop and no friction rub.    No murmur heard.  Pulmonary/Chest: Effort normal and breath sounds normal. No respiratory distress. She has no wheezes. She has no rales.   Abdominal: Soft. Bowel sounds are normal. There is no hepatosplenomegaly.   Musculoskeletal: She exhibits no edema or deformity.   Lymphadenopathy:     She has no cervical adenopathy.   Neurological: She is alert. She has normal reflexes. She displays normal reflexes. No cranial nerve deficit.   Skin: Skin is warm and dry. No rash noted.   Nursing note and vitals reviewed.      ASSESSMENT AND PLAN  Update vaccines if indicated.    return for routine annual checkups    Paradise was seen today for annual exam.    Diagnoses and all orders for this visit:    Annual physical exam    Other orders  -     diphenhydrAMINE (BENADRYL) 25 MG tablet; Take 1 tablet by mouth Every 6 (Six) Hours As Needed for Itching.  -     FLUoxetine (PROzac) 20 MG capsule; Take 1 capsule by mouth Daily.  -     acetaminophen (TYLENOL) 325 MG tablet; Take 1 tablet by mouth Every 6 (Six) Hours As Needed for Mild Pain .    UTD on tdap (6/4/2012).  Last labs reviewed.  Hepatitis A today; 2nd dose in 6-12 months.    [unfilled]    Return if symptoms worsen or fail to improve, for Annual physical.

## 2018-05-23 ENCOUNTER — TELEPHONE (OUTPATIENT)
Dept: ENDOCRINOLOGY | Age: 33
End: 2018-05-23

## 2018-05-23 DIAGNOSIS — E16.1 HYPOGLYCEMIA DUE TO ENDOGENOUS HYPERINSULINEMIA: Primary | ICD-10-CM

## 2018-05-23 DIAGNOSIS — IMO0002 DIABETES MELLITUS TYPE 1, UNCONTROLLED, WITH COMPLICATIONS: ICD-10-CM

## 2018-05-23 DIAGNOSIS — E10.8 TYPE 1 DIABETES MELLITUS WITH COMPLICATIONS (HCC): ICD-10-CM

## 2018-05-23 NOTE — TELEPHONE ENCOUNTER
----- Message from Iqra Rice sent at 5/21/2018 11:47 AM EDT -----  Contact: MOTHER  MOTHER STATED THAT SHE BROUGHT COPIES TO BE FILLED OUT FOR HER DAUGHTER.  PATIENT IS A SPECIAL NEEDS PATIENT AND SHE GOES TO A DAY PROGRAM. THERE WERE 2 FORMS WITH INSTRUCTIONS OF WHAT NEED WHAT NEEDS TO BE DONE.   MOTHER WAS WONDERING IF THEY WERE CREATED OR IF THERE WAS A PROBLEM GETTING THEM FILLED OUT.  MOTHER STATED SHE COULD BRING COPIES IF NEED BE.    SPOKE WITH PATIENT AND FIXED THE LETTER SHE IS NEEDING FOR ADULT

## 2018-06-25 ENCOUNTER — OFFICE VISIT (OUTPATIENT)
Dept: ENDOCRINOLOGY | Age: 33
End: 2018-06-25

## 2018-06-25 VITALS
DIASTOLIC BLOOD PRESSURE: 64 MMHG | SYSTOLIC BLOOD PRESSURE: 102 MMHG | HEART RATE: 52 BPM | OXYGEN SATURATION: 98 % | WEIGHT: 101 LBS | BODY MASS INDEX: 18.58 KG/M2 | HEIGHT: 62 IN

## 2018-06-25 DIAGNOSIS — Z79.4 ENCOUNTER FOR LONG-TERM (CURRENT) USE OF INSULIN (HCC): ICD-10-CM

## 2018-06-25 DIAGNOSIS — E55.9 VITAMIN D DEFICIENCY: ICD-10-CM

## 2018-06-25 DIAGNOSIS — E16.1 HYPERINSULINISM: ICD-10-CM

## 2018-06-25 DIAGNOSIS — IMO0002 DIABETES MELLITUS TYPE 1, UNCONTROLLED, WITH COMPLICATIONS: Primary | ICD-10-CM

## 2018-06-25 DIAGNOSIS — G80.1 SPASTIC DIPLEGIC CEREBRAL PALSY (HCC): ICD-10-CM

## 2018-06-25 DIAGNOSIS — G40.909 SEIZURE DISORDER (HCC): ICD-10-CM

## 2018-06-25 PROBLEM — E10.8 TYPE 1 DIABETES MELLITUS WITH COMPLICATIONS (HCC): Status: RESOLVED | Noted: 2018-02-19 | Resolved: 2018-06-25

## 2018-06-25 PROCEDURE — 99214 OFFICE O/P EST MOD 30 MIN: CPT | Performed by: INTERNAL MEDICINE

## 2018-06-25 NOTE — PROGRESS NOTES
33 y.o.    Patient Care Team:  Drew Blanchard MD as PCP - General (Family Medicine)  Angel Roque MD as PCP - Family Medicine  Giovanni MORGAN MD as Consulting Physician (Endocrinology)  Mickey Vyas Jr., MD as Consulting Physician (Neurology)  Migue Echvaarria MD as Consulting Physician (Allergy)    Chief Complaint:      F/U TYPE 1 DIABETES, UNCONTROLLED.  NO RECENT LABS.     Subjective     HPI   Patient is a 34-year-old white female with a history of severe mental condition, cerebral palsy, seizure disorder and  behavioral problems along with uncontrolled type 1 diabetes mellitus since age 18 came for follow-up  Patient is accompanied by both parents were caregivers  Patient is currently taking Levemir 15 units twice daily  She gets NovoLog as per the meal ratio of carbs and correction  Patient has occasional hypoglycemia with a blood sugar dropping into 60 range but not too frequently  This usually happens during the day  Patient has no history of diabetic retinopathy or nephropathy or neuropathy  Vitamin D deficiency  Patient is currently taking vitamin D3 daily at dinnertime  Patient is currently maintaining her weight       Interval History   Patient was brought in by both parents who are the primary historians since patient is noncommunicative.  They reported that patient was diabetic for the past 10 years, has been on different insulin regimens, a few years ago started Levemir and NovoLog, at that time they were seeing an endocrinologist in [place].  She gets 29 units of l Levemir in the morning and 5 units at night, NovoLog 1 unit for every 15 carbs and 1 unit for every 30-50 mg a blood sugar over 150 for correction.  They reported usual hypoglycemia in the early morning hours, and higher blood sugars in the 200-250 range at dinnertime or suppertime. Patient apparently spends most of the day at the , and sometimes she does not get NovoLog for lunch at the .  Apparently the staff decide to give insulin or not give insulin based on patient's behavior and blood sugar.  both parents denied any knowledge of diabetic retinopathy, nephropathy, neuropathy   Does not denied any knowledge of thyroid disorder  Mother is trying to get her eye examination done sometime this year  Patient is mostly nonverbal, gives only a momentary eye contact.  According to the parents patient has done relatively well since her last adjustment of insulin dose  Most of the blood sugars have been less than 200 at home. The  center did not send any Accu-Chek log this time.   Parents report that patient developed a rash with severe itching over the past several months and has been on prednisone on and off. Currently she is taking 10 mg prednisone taper and will probably on this medication for several weeks. Patient's blood sugars have been elevated in the 200-300 range and they have been increasing the insulin and gradually.  She is already seeing the dermatologist and 2 allergy specialists with no clear etiology identified. Medication list was reviewed but did not seem specifically connected to the rash.  Interval history  Mother reported that since they decided Levemir and NovoLog were not causing the rash as per the allergist she started going back on these medications. She has been on prednisone for the past several months and her blood sugars have been in the 200-400 range. They started tapering the prednisone and currently she is taking 5 mg daily and will soon start taking 5 mg every other day.  The following portions of the patient's history were reviewed and updated as appropriate: allergies, current medications, past family history, past medical history, past social history, past surgical history and problem list.    Past Medical History:   Diagnosis Date   • Cerebral palsy     birth   • Diabetes mellitus    • Diabetes mellitus type I    • Hives     chronic   • Hives    • Seizures       Family History   Problem Relation Age of Onset   • Hypertension Mother    • Hyperlipidemia Mother    • Seasonal affective disorder Father      Social History     Social History   • Marital status: Single     Spouse name: N/A   • Number of children: N/A   • Years of education: N/A     Occupational History   • Not on file.     Social History Main Topics   • Smoking status: Never Smoker   • Smokeless tobacco: Never Used   • Alcohol use No   • Drug use: Unknown   • Sexual activity: No     Other Topics Concern   • Not on file     Social History Narrative   • No narrative on file     No Known Allergies    Current Outpatient Prescriptions:   •  acetaminophen (TYLENOL) 325 MG tablet, Take 1 tablet by mouth Every 6 (Six) Hours As Needed for Mild Pain ., Disp: 30 tablet, Rfl: 11  •  Cholecalciferol 2000 UNITS capsule, Take 5,000 Units by mouth., Disp: , Rfl:   •  Diapers & Supplies misc, Depends pull-ups in small/medium.  Use as needed.  Needs 2 cases every other month., Disp: 2 each, Rfl: 5  •  diphenhydrAMINE (BENADRYL) 25 MG tablet, Take 1 tablet by mouth Every 6 (Six) Hours As Needed for Itching., Disp: 30 tablet, Rfl: 11  •  FLUoxetine (PROzac) 20 MG capsule, Take 1 capsule by mouth Daily., Disp: 30 capsule, Rfl: 11  •  glucagon (GLUCAGEN) 1 MG injection, Inject 1 mg into the shoulder, thigh, or buttocks 1 (One) Time As Needed for Low Blood Sugar for up to 1 dose., Disp: 2 kit, Rfl: 6  •  glucose blood (ONETOUCH VERIO) test strip, CHECK GLUCOSE BEFORE LUNCH AND AS NEEDED, Disp: 200 each, Rfl: 3  •  insulin aspart (NOVOLOG FLEXPEN) 100 UNIT/ML solution pen-injector sc pen, 8 UNITS TIDAC SC, Disp: 5 pen, Rfl: 10  •  insulin detemir (LEVEMIR FLEXTOUCH) 100 UNIT/ML injection, 20 UNITS TWICE DAILY, Disp: 5 pen, Rfl: 10  •  Insulin Pen Needle (PEN NEEDLES) 32G X 4 MM misc, USE TO TEST 5 TIMES DAILY, Disp: 100 each, Rfl: 5  •  levocetirizine (XYZAL) 5 MG tablet, Take 5 mg by mouth every evening., Disp: , Rfl:   •   "omalizumab (XOLAIR) 150 MG injection, Inject 300 mg under the skin 1 (one) time., Disp: , Rfl:   •  OXcarbazepine (TRILEPTAL) 600 MG tablet, Take 1 tablet by mouth 2 (Two) Times a Day. Take 1 1/2 tab bid, Disp: 90 tablet, Rfl: 11  •  SUNSCREEN SPF50 lotion, Apply generously every 2 hours while outside., Disp: 1 bottle, Rfl: 11  •  topiramate (TOPAMAX) 25 MG tablet, Take 3 tablets by mouth 2 (Two) Times a Day., Disp: 180 tablet, Rfl: 11        Review of Systems   Constitutional: Negative for chills, fatigue and fever.   Cardiovascular: Negative for chest pain and palpitations.   Gastrointestinal: Negative for abdominal pain, constipation, diarrhea, nausea and vomiting.   Endocrine: Negative for cold intolerance and heat intolerance.   All other systems reviewed and are negative.      Objective       Vitals:    06/25/18 1500   BP: 102/64   Pulse: 52   SpO2: 98%   Weight: 45.8 kg (101 lb)   Height: 157.5 cm (62\")     Body mass index is 18.47 kg/m².      Physical Exam   Eyes: EOM are normal. Pupils are equal, round, and reactive to light.   Neck: Normal range of motion. Neck supple. No thyromegaly present.   Cardiovascular: Normal rate, regular rhythm, normal heart sounds and intact distal pulses.    Pulmonary/Chest: Effort normal and breath sounds normal.   Abdominal: Soft. Bowel sounds are normal. She exhibits no distension. There is no tenderness.   Musculoskeletal: Normal range of motion. She exhibits no edema.   Neurological: She is alert.   Skin: Skin is warm and dry.   Psychiatric: Her behavior is normal.   Nursing note and vitals reviewed.    Results Review:     I reviewed the patient's new clinical results.    Medical records reviewed  Summary:      Office Visit on 02/19/2018   Component Date Value Ref Range Status   • Glucose 02/19/2018 200* 65 - 99 mg/dL Final   • BUN 02/19/2018 17  6 - 20 mg/dL Final   • Creatinine 02/19/2018 0.76  0.57 - 1.00 mg/dL Final   • eGFR Non  Am 02/19/2018 89  >60 " mL/min/1.73 Final   • eGFR  Am 02/19/2018 108  >60 mL/min/1.73 Final   • BUN/Creatinine Ratio 02/19/2018 22.4  7.0 - 25.0 Final   • Sodium 02/19/2018 139  136 - 145 mmol/L Final   • Potassium 02/19/2018 3.8  3.5 - 5.2 mmol/L Final   • Chloride 02/19/2018 102  98 - 107 mmol/L Final   • Total CO2 02/19/2018 24.3  22.0 - 29.0 mmol/L Final   • Calcium 02/19/2018 8.8  8.6 - 10.5 mg/dL Final   • Total Protein 02/19/2018 7.3  6.0 - 8.5 g/dL Final   • Albumin 02/19/2018 4.30  3.50 - 5.20 g/dL Final   • Globulin 02/19/2018 3.0  gm/dL Final   • A/G Ratio 02/19/2018 1.4  g/dL Final   • Total Bilirubin 02/19/2018 <0.2  0.1 - 1.2 mg/dL Final   • Alkaline Phosphatase 02/19/2018 88  39 - 117 U/L Final   • AST (SGOT) 02/19/2018 23  1 - 32 U/L Final   • ALT (SGPT) 02/19/2018 29  1 - 33 U/L Final   • Hemoglobin A1C 02/19/2018 6.40* 4.80 - 5.60 % Final    Comment: Hemoglobin A1C Ranges:  Increased Risk for Diabetes  5.7% to 6.4%  Diabetes                     >= 6.5%  Diabetic Goal                < 7.0%     • TSH 02/19/2018 2.200  0.270 - 4.200 mIU/mL Final   • 25 Hydroxy, Vitamin D 02/19/2018 79.7  30.0 - 100.0 ng/mL Final    Comment: Reference Range for Total Vitamin D 25(OH)  Deficiency    <20.0 ng/mL  Insufficiency 21-29 ng/mL  Sufficiency    ng/mL  Toxicity      >100 ng/ml        • Fructosamine 02/19/2018 386* 0 - 285 umol/L Final    Comment: Published reference interval for apparently healthy subjects  between age 20 and 60 is 205 - 285 umol/L and in a poorly  controlled diabetic population is 228 - 563 umol/L with a  mean of 396 umol/L.       Lab Results   Component Value Date    HGBA1C 6.40 (H) 02/19/2018    HGBA1C 6.50 (H) 10/17/2017    HGBA1C 6.25 (H) 05/30/2017     Lab Results   Component Value Date    CREATININE 0.76 02/19/2018     Imaging Results (most recent)     None                Assessment and Plan:    [place] was seen today for diabetes.    Diagnoses and all orders for this visit:    Diabetes mellitus type  "1, uncontrolled, with complications    Vitamin D deficiency  -     Comprehensive Metabolic Panel  -     Hemoglobin A1c  -     TSH  -     Vitamin D 25 Hydroxy  -     Fructosamine    Seizure disorder    Spastic diplegic cerebral palsy    Hyperinsulinism    Encounter for long-term (current) use of insulin           #1 uncontrolled type I diabetes mellitus with the last hemoglobin A1c was 6.9%   #2 hypoglycemia  #3 seizure disorder currently on medication but apparently hypoglycemia is the trigger, and they are trying to avoid that.  #4 vitamin D deficiency patient is currently taking 1000 units daily     Patient's glucometer readings reviewed  Patient has blood sugars ranging from   Majority are in the 100-300 range  Patient is taking Levemir 15 units twice daily  If the bedtime blood sugars are less than 140 then they give half the dose  Parents continue to give NovoLog 1 unit for every 15 carbs and one unit for every 40/140 correction  Occasional hypoglycemia noted    Fructosamine levels were elevated at 386 more in line with the Accu-Cheks being elevated  Her hemoglobin A1c of 6.5% does not accurately reflect her glycemic status    Patient will get lab testing   Patient will return to follow-up in 4 months.    The total time spent  was more than 25 min of which greater than 15 min of time ( greater than 50% of the total time )  was spent face to face with the patient counseling and coordination of care on recommended evaluation and treatment options, instructions for management/treatment and /or follow up  and importance of compliance with chosen management or treatment options  Giovanni Lewis MD. FACE    06/25/18      EMR Dragon / transcription disclaimer:     \"Dictated utilizing Dragon dictation\".         "

## 2018-06-26 ENCOUNTER — TELEPHONE (OUTPATIENT)
Dept: ENDOCRINOLOGY | Age: 33
End: 2018-06-26

## 2018-06-26 LAB
25(OH)D3+25(OH)D2 SERPL-MCNC: 102.9 NG/ML (ref 30–100)
ALBUMIN SERPL-MCNC: 3.8 G/DL (ref 3.5–5.2)
ALBUMIN/GLOB SERPL: 1.2 G/DL
ALP SERPL-CCNC: 82 U/L (ref 39–117)
ALT SERPL-CCNC: 30 U/L (ref 1–33)
AST SERPL-CCNC: 23 U/L (ref 1–32)
BILIRUB SERPL-MCNC: <0.2 MG/DL (ref 0.1–1.2)
BUN SERPL-MCNC: 16 MG/DL (ref 6–20)
BUN/CREAT SERPL: 21.6 (ref 7–25)
CALCIUM SERPL-MCNC: 8.5 MG/DL (ref 8.6–10.5)
CHLORIDE SERPL-SCNC: 101 MMOL/L (ref 98–107)
CO2 SERPL-SCNC: 22.8 MMOL/L (ref 22–29)
CREAT SERPL-MCNC: 0.74 MG/DL (ref 0.57–1)
FRUCTOSAMINE SERPL-SCNC: 405 UMOL/L (ref 0–285)
GLOBULIN SER CALC-MCNC: 3.1 GM/DL
GLUCOSE SERPL-MCNC: 360 MG/DL (ref 65–99)
HBA1C MFR BLD: 7.1 % (ref 4.8–5.6)
POTASSIUM SERPL-SCNC: 4.2 MMOL/L (ref 3.5–5.2)
PROT SERPL-MCNC: 6.9 G/DL (ref 6–8.5)
SODIUM SERPL-SCNC: 136 MMOL/L (ref 136–145)
TSH SERPL DL<=0.005 MIU/L-ACNC: 1.47 MIU/ML (ref 0.27–4.2)

## 2018-06-26 NOTE — TELEPHONE ENCOUNTER
Dr Nunez is aware and been noted in chart    ----- Message from Iqra Merrill sent at 6/26/2018  8:54 AM EDT -----  Contact: LUZ FIGUEROA - VITAMIN D LEVEL .9

## 2018-07-06 ENCOUNTER — TELEPHONE (OUTPATIENT)
Dept: ENDOCRINOLOGY | Age: 33
End: 2018-07-06

## 2018-07-06 NOTE — TELEPHONE ENCOUNTER
PA IS IN PROCESS AS OF 7/6/18 AND WAS APPROVED.     ----- Message from Iqra Merrill sent at 7/5/2018  4:15 PM EDT -----  Contact: CHERELLE  PATIENT MOTHER CALLED IN REGARDS TO insulin aspart (NOVOLOG FLEXPEN) 100 UNIT/ML solution pen-injector sc pen NEEDING PRIOR AUTHORIZATION    [facility] [phone]

## 2018-07-26 DIAGNOSIS — E10.8 TYPE 1 DIABETES MELLITUS WITH COMPLICATIONS (HCC): ICD-10-CM

## 2018-07-27 RX ORDER — PEN NEEDLE, DIABETIC 32GX 5/32"
NEEDLE, DISPOSABLE MISCELLANEOUS
Qty: 100 EACH | Refills: 4 | Status: SHIPPED | OUTPATIENT
Start: 2018-07-27 | End: 2018-10-30 | Stop reason: SDUPTHER

## 2018-09-20 RX ORDER — TOPIRAMATE 25 MG/1
25 TABLET ORAL 2 TIMES DAILY
Qty: 60 TABLET | Refills: 5 | Status: SHIPPED | OUTPATIENT
Start: 2018-09-20 | End: 2019-01-22 | Stop reason: SDUPTHER

## 2018-09-20 RX ORDER — TOPIRAMATE 50 MG/1
50 TABLET, FILM COATED ORAL 2 TIMES DAILY
Qty: 60 TABLET | Refills: 5 | Status: SHIPPED | OUTPATIENT
Start: 2018-09-20 | End: 2019-01-22 | Stop reason: SDUPTHER

## 2018-10-17 DIAGNOSIS — E10.8 TYPE 1 DIABETES MELLITUS WITH COMPLICATIONS (HCC): ICD-10-CM

## 2018-10-18 RX ORDER — BLOOD SUGAR DIAGNOSTIC
STRIP MISCELLANEOUS
Qty: 100 EACH | Refills: 2 | Status: SHIPPED | OUTPATIENT
Start: 2018-10-18 | End: 2018-12-26 | Stop reason: SDUPTHER

## 2018-10-30 DIAGNOSIS — E10.8 TYPE 1 DIABETES MELLITUS WITH COMPLICATIONS (HCC): ICD-10-CM

## 2018-10-31 RX ORDER — PEN NEEDLE, DIABETIC 32GX 5/32"
NEEDLE, DISPOSABLE MISCELLANEOUS
Qty: 100 EACH | Refills: 3 | Status: SHIPPED | OUTPATIENT
Start: 2018-10-31 | End: 2019-01-14 | Stop reason: SDUPTHER

## 2018-11-06 ENCOUNTER — OFFICE VISIT (OUTPATIENT)
Dept: ENDOCRINOLOGY | Age: 33
End: 2018-11-06

## 2018-11-06 VITALS
WEIGHT: 100.2 LBS | HEIGHT: 62 IN | SYSTOLIC BLOOD PRESSURE: 114 MMHG | HEART RATE: 69 BPM | BODY MASS INDEX: 18.44 KG/M2 | DIASTOLIC BLOOD PRESSURE: 68 MMHG

## 2018-11-06 DIAGNOSIS — E16.1 HYPERINSULINISM: ICD-10-CM

## 2018-11-06 DIAGNOSIS — G80.1 SPASTIC DIPLEGIC CEREBRAL PALSY (HCC): ICD-10-CM

## 2018-11-06 DIAGNOSIS — E10.8 TYPE 1 DIABETES MELLITUS WITH COMPLICATIONS (HCC): ICD-10-CM

## 2018-11-06 DIAGNOSIS — Z79.4 ENCOUNTER FOR LONG-TERM (CURRENT) USE OF INSULIN (HCC): ICD-10-CM

## 2018-11-06 DIAGNOSIS — IMO0002 DIABETES MELLITUS TYPE 1, UNCONTROLLED, WITH COMPLICATIONS: ICD-10-CM

## 2018-11-06 DIAGNOSIS — G40.909 SEIZURE DISORDER (HCC): ICD-10-CM

## 2018-11-06 DIAGNOSIS — E55.9 VITAMIN D DEFICIENCY: Primary | ICD-10-CM

## 2018-11-06 PROCEDURE — 99214 OFFICE O/P EST MOD 30 MIN: CPT | Performed by: INTERNAL MEDICINE

## 2018-11-06 NOTE — PROGRESS NOTES
33 y.o.    Patient Care Team:  Drew Blanchard MD as PCP - General (Family Medicine)  Angel Roque MD as PCP - Family Medicine  Giovanni Lewis MD as Consulting Physician (Endocrinology)  Mickey Vyas Jr., MD as Consulting Physician (Neurology)  Migue Echavarria MD as Consulting Physician (Allergy)    Chief Complaint:      F/U TYPE 1 DIABETES, UNCONTROLLED.  NO RECENT LABS.  Subjective     HPI   Patient is a 33-year-old white female with a history of severe mental retardation, cerebral palsy, seizure disorder and behavioral problems along with uncontrolled type 1 diabetes mellitus since age 18 came for follow-up  Patient is accompanied by both parents were caregivers    Patient is currently taking Levemir 15 units twice daily  She gets NovoLog 1 unit for every 15 carbs and a correction of 1 unit for every 40/140  Patient has occasional hypoglycemia  Most of the blood sugars are in the  range  Lunchtime blood sugars are consistently in the 300 range  Patient apparently gets a sugar-free Jell-O around 11 AM  There is no history of diabetic retinopathy or nephropathy or neuropathy  Patient's hemoglobin A1c is always very low and not consistent with her blood sugars  Her fructosamine levels were elevated suggesting that hemoglobin A1c may be falsely low in her case  Vitamin D deficiency  Patient is currently taking 5000 units of vitamin D daily    Interval History   Patient was brought in by both parents who are the primary historians since patient is noncommunicative.  They reported that patient was diabetic for the past 10 years, has been on different insulin regimens, a few years ago started Levemir and NovoLog, at that time they were seeing an endocrinologist in [place].  She gets 29 units of l Levemir in the morning and 5 units at night, NovoLog 1 unit for every 15 carbs and 1 unit for every 30-50 mg a blood sugar over 150 for correction.  They reported usual hypoglycemia in  the early morning hours, and higher blood sugars in the 200-250 range at dinnertime or suppertime. Patient apparently spends most of the day at the , and sometimes she does not get NovoLog for lunch at the . Apparently the staff decide to give insulin or not give insulin based on patient's behavior and blood sugar.  both parents denied any knowledge of diabetic retinopathy, nephropathy, neuropathy   Does not denied any knowledge of thyroid disorder  Mother is trying to get her eye examination done sometime this year  Patient is mostly nonverbal, gives only a momentary eye contact.  According to the parents patient has done relatively well since her last adjustment of insulin dose  Most of the blood sugars have been less than 200 at home. The  center did not send any Accu-Chek log this time.   Parents report that patient developed a rash with severe itching over the past several months and has been on prednisone on and off. Currently she is taking 10 mg prednisone taper and will probably on this medication for several weeks. Patient's blood sugars have been elevated in the 200-300 range and they have been increasing the insulin and gradually.  She is already seeing the dermatologist and 2 allergy specialists with no clear etiology identified. Medication list was reviewed but did not seem specifically connected to the rash.  Interval history  Mother reported that since they decided Levemir and NovoLog were not causing the rash as per the allergist she started going back on these medications. She has been on prednisone for the past several months and her blood sugars have been in the 200-400 range. They started tapering the prednisone and currently she is taking 5 mg daily and will soon start taking 5 mg every other day.  The following portions of the patient's history were reviewed and updated as appropriate: allergies, current medications, past family history, past medical history, past social  history, past surgical history and problem list.    Past Medical History:   Diagnosis Date   • Cerebral palsy (CMS/HCC)     birth   • Diabetes mellitus (CMS/HCC)    • Diabetes mellitus type I (CMS/HCC)    • Hives     chronic   • Hives    • Seizures (CMS/HCC)      Family History   Problem Relation Age of Onset   • Hypertension Mother    • Hyperlipidemia Mother    • Seasonal affective disorder Father      Social History     Social History   • Marital status: Single     Spouse name: N/A   • Number of children: N/A   • Years of education: N/A     Occupational History   • Not on file.     Social History Main Topics   • Smoking status: Never Smoker   • Smokeless tobacco: Never Used   • Alcohol use No   • Drug use: Unknown   • Sexual activity: No     Other Topics Concern   • Not on file     Social History Narrative   • No narrative on file     No Known Allergies    Current Outpatient Prescriptions:   •  acetaminophen (TYLENOL) 325 MG tablet, Take 1 tablet by mouth Every 6 (Six) Hours As Needed for Mild Pain ., Disp: 30 tablet, Rfl: 11  •  BD PEN NEEDLE SCOTTIE U/F 32G X 4 MM misc, USE ONE FIVE TIMES A DAY, Disp: 100 each, Rfl: 3  •  Cholecalciferol 2000 UNITS capsule, Take 5,000 Units by mouth., Disp: , Rfl:   •  Diapers & Supplies misc, Depends pull-ups in small/medium.  Use as needed.  Needs 2 cases every other month., Disp: 2 each, Rfl: 5  •  diphenhydrAMINE (BENADRYL) 25 MG tablet, Take 1 tablet by mouth Every 6 (Six) Hours As Needed for Itching., Disp: 30 tablet, Rfl: 11  •  FLUoxetine (PROzac) 20 MG capsule, Take 1 capsule by mouth Daily., Disp: 30 capsule, Rfl: 11  •  glucagon (GLUCAGEN) 1 MG injection, Inject 1 mg into the shoulder, thigh, or buttocks 1 (One) Time As Needed for Low Blood Sugar for up to 1 dose., Disp: 2 kit, Rfl: 6  •  glucose blood (ONETOUCH VERIO) test strip, CHECK GLUCOSE BEFORE LUNCH AND AS NEEDED, Disp: 200 each, Rfl: 3  •  insulin aspart (NOVOLOG FLEXPEN) 100 UNIT/ML solution pen-injector sc  "pen, 8 UNITS TIDAC SC, Disp: 5 pen, Rfl: 10  •  insulin detemir (LEVEMIR FLEXTOUCH) 100 UNIT/ML injection, 20 UNITS TWICE DAILY, Disp: 5 pen, Rfl: 10  •  levocetirizine (XYZAL) 5 MG tablet, Take 5 mg by mouth every evening., Disp: , Rfl:   •  omalizumab (XOLAIR) 150 MG injection, Inject 300 mg under the skin 1 (one) time., Disp: , Rfl:   •  ONETOUCH VERIO test strip, USE ONE STRIP TO TEST FIVE TIMES A DAY, Disp: 100 each, Rfl: 2  •  OXcarbazepine (TRILEPTAL) 600 MG tablet, Take 1 tablet by mouth 2 (Two) Times a Day. Take 1 1/2 tab bid, Disp: 90 tablet, Rfl: 11  •  SUNSCREEN SPF50 lotion, Apply generously every 2 hours while outside., Disp: 1 bottle, Rfl: 11  •  topiramate (TOPAMAX) 25 MG tablet, Take 1 tablet by mouth 2 (Two) Times a Day., Disp: 60 tablet, Rfl: 5  •  topiramate (TOPAMAX) 50 MG tablet, Take 1 tablet by mouth 2 (Two) Times a Day., Disp: 60 tablet, Rfl: 5        Review of Systems   Constitutional: Negative for chills, fatigue and fever.   Cardiovascular: Negative for chest pain and palpitations.   Gastrointestinal: Negative for abdominal pain, constipation, diarrhea, nausea and vomiting.   Endocrine: Negative for cold intolerance.   All other systems reviewed and are negative.      Objective       Vitals:    11/06/18 1032   BP: 114/68   Pulse: 69   Weight: 45.5 kg (100 lb 3.2 oz)   Height: 157.5 cm (62\")     Body mass index is 18.33 kg/m².      Physical Exam   Eyes: Pupils are equal, round, and reactive to light. EOM are normal.   Neck: Normal range of motion. Neck supple. No thyromegaly present.   Cardiovascular: Normal rate, regular rhythm, normal heart sounds and intact distal pulses.    Pulmonary/Chest: Effort normal and breath sounds normal.   Abdominal: Soft. Bowel sounds are normal. She exhibits no distension. There is no tenderness.   Musculoskeletal: Normal range of motion. She exhibits no edema.   Neurological: She is alert.   Skin: Skin is warm and dry.   Psychiatric: Her behavior is normal. "   Nursing note and vitals reviewed.    Results Review:     I reviewed the patient's new clinical results.    Medical records reviewed  Summary:      Office Visit on 06/25/2018   Component Date Value Ref Range Status   • Glucose 06/25/2018 360* 65 - 99 mg/dL Final   • BUN 06/25/2018 16  6 - 20 mg/dL Final   • Creatinine 06/25/2018 0.74  0.57 - 1.00 mg/dL Final   • eGFR Non  Am 06/25/2018 91  >60 mL/min/1.73 Final   • eGFR African Am 06/25/2018 110  >60 mL/min/1.73 Final   • BUN/Creatinine Ratio 06/25/2018 21.6  7.0 - 25.0 Final   • Sodium 06/25/2018 136  136 - 145 mmol/L Final   • Potassium 06/25/2018 4.2  3.5 - 5.2 mmol/L Final   • Chloride 06/25/2018 101  98 - 107 mmol/L Final   • Total CO2 06/25/2018 22.8  22.0 - 29.0 mmol/L Final   • Calcium 06/25/2018 8.5* 8.6 - 10.5 mg/dL Final   • Total Protein 06/25/2018 6.9  6.0 - 8.5 g/dL Final   • Albumin 06/25/2018 3.80  3.50 - 5.20 g/dL Final   • Globulin 06/25/2018 3.1  gm/dL Final   • A/G Ratio 06/25/2018 1.2  g/dL Final   • Total Bilirubin 06/25/2018 <0.2  0.1 - 1.2 mg/dL Final   • Alkaline Phosphatase 06/25/2018 82  39 - 117 U/L Final   • AST (SGOT) 06/25/2018 23  1 - 32 U/L Final   • ALT (SGPT) 06/25/2018 30  1 - 33 U/L Final   • Hemoglobin A1C 06/25/2018 7.10* 4.80 - 5.60 % Final    Comment: Hemoglobin A1C Ranges:  Increased Risk for Diabetes  5.7% to 6.4%  Diabetes                     >= 6.5%  Diabetic Goal                < 7.0%     • TSH 06/25/2018 1.470  0.270 - 4.200 mIU/mL Final   • 25 Hydroxy, Vitamin D 06/25/2018 102.9* 30.0 - 100.0 ng/ml Corrected    Comment: Reference Range for Total Vitamin D 25(OH)  Deficiency    <20.0 ng/mL  Insufficiency 21-29 ng/mL  Sufficiency    ng/mL  Toxicity      >100 ng/ml        • Fructosamine 06/25/2018 405* 0 - 285 umol/L Final    Comment: Published reference interval for apparently healthy subjects  between age 20 and 60 is 205 - 285 umol/L and in a poorly  controlled diabetic population is 228 - 563 umol/L with  a  mean of 396 umol/L.       Lab Results   Component Value Date    HGBA1C 7.10 (H) 06/25/2018    HGBA1C 6.40 (H) 02/19/2018    HGBA1C 6.50 (H) 10/17/2017     Lab Results   Component Value Date    CREATININE 0.74 06/25/2018     Imaging Results (most recent)     None          Assessment and Plan:    Paradise was seen today for diabetes.    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  #4 vitamin D deficiency patient is currently taking 1000 units daily     Patient's glucometer readings reviewed  Blood sugars ranging from   Majority at lunch are in the 200-300 range  Patient usually spends at the  center for lunch and gets a sugar-free Jell-O before lunch  Even the blood sugars on weekends at home are in the 300 range at lunchtime    I advised the parents to try 5 units of NovoLog for breakfast and see  Fructosamine levels were also in the 400 range suggesting that her hemoglobin A1c is significantly higher than 7.1    Patient is taking Levemir 15 units twice daily  If the bedtime blood sugars are less than 140 then they give half the dose  Parents continue to give NovoLog 1 unit for every 15 carbs and one unit for every 40/140 correction  Occasional  "hypoglycemia noted    I also discussed the use of dexcom  Both parents are interested  Will start discussing with the dexcom representative    Patient will return to follow-up in 4 months    The total time spent  was more than 25 min of which greater than 15 min of time (greater than 50% of the total time)  was spent face to face with the patient counseling and coordination of care on recommended evaluation and treatment options, instructions for management/treatment and /or follow up and importance of compliance with chosen management or treatment options       Giovanni Lewis MD. FACE    11/06/18      EMR Dragon / transcription disclaimer:     \"Dictated utilizing Dragon dictation\".         "

## 2018-11-07 LAB
25(OH)D3+25(OH)D2 SERPL-MCNC: 113.8 NG/ML (ref 30–100)
ALBUMIN SERPL-MCNC: 3.9 G/DL (ref 3.5–5.2)
ALBUMIN/GLOB SERPL: 1.3 G/DL
ALP SERPL-CCNC: 80 U/L (ref 39–117)
ALT SERPL-CCNC: 29 U/L (ref 1–33)
AST SERPL-CCNC: 23 U/L (ref 1–32)
BILIRUB SERPL-MCNC: 0.2 MG/DL (ref 0.1–1.2)
BUN SERPL-MCNC: 19 MG/DL (ref 6–20)
BUN/CREAT SERPL: 23.8 (ref 7–25)
CALCIUM SERPL-MCNC: 9.1 MG/DL (ref 8.6–10.5)
CHLORIDE SERPL-SCNC: 106 MMOL/L (ref 98–107)
CO2 SERPL-SCNC: 24.2 MMOL/L (ref 22–29)
CREAT SERPL-MCNC: 0.8 MG/DL (ref 0.57–1)
FRUCTOSAMINE SERPL-SCNC: 395 UMOL/L (ref 0–285)
GLOBULIN SER CALC-MCNC: 3.1 GM/DL
GLUCOSE SERPL-MCNC: 383 MG/DL (ref 65–99)
HBA1C MFR BLD: 6.88 % (ref 4.8–5.6)
POTASSIUM SERPL-SCNC: 4.5 MMOL/L (ref 3.5–5.2)
PROT SERPL-MCNC: 7 G/DL (ref 6–8.5)
SODIUM SERPL-SCNC: 141 MMOL/L (ref 136–145)
TSH SERPL DL<=0.005 MIU/L-ACNC: 0.89 MIU/ML (ref 0.27–4.2)

## 2018-11-26 ENCOUNTER — CLINICAL SUPPORT (OUTPATIENT)
Dept: INTERNAL MEDICINE | Facility: CLINIC | Age: 33
End: 2018-11-26

## 2018-11-26 PROCEDURE — 90632 HEPA VACCINE ADULT IM: CPT | Performed by: FAMILY MEDICINE

## 2018-11-26 PROCEDURE — 90471 IMMUNIZATION ADMIN: CPT | Performed by: FAMILY MEDICINE

## 2018-12-20 RX ORDER — DIAZEPAM 20 MG/4ML
GEL RECTAL
Qty: 1 EACH | Refills: 4 | Status: SHIPPED | OUTPATIENT
Start: 2018-12-20 | End: 2019-01-22 | Stop reason: SDUPTHER

## 2018-12-26 DIAGNOSIS — E10.8 TYPE 1 DIABETES MELLITUS WITH COMPLICATIONS (HCC): ICD-10-CM

## 2018-12-27 RX ORDER — BLOOD SUGAR DIAGNOSTIC
STRIP MISCELLANEOUS
Qty: 100 EACH | Refills: 1 | Status: SHIPPED | OUTPATIENT
Start: 2018-12-27 | End: 2019-02-12 | Stop reason: SDUPTHER

## 2019-01-14 DIAGNOSIS — E10.8 TYPE 1 DIABETES MELLITUS WITH COMPLICATIONS (HCC): ICD-10-CM

## 2019-01-14 RX ORDER — PEN NEEDLE, DIABETIC 32GX 5/32"
NEEDLE, DISPOSABLE MISCELLANEOUS
Qty: 100 EACH | Refills: 2 | Status: SHIPPED | OUTPATIENT
Start: 2019-01-14 | End: 2019-03-16 | Stop reason: SDUPTHER

## 2019-01-14 RX ORDER — OXCARBAZEPINE 600 MG/1
TABLET, FILM COATED ORAL
Qty: 90 TABLET | Refills: 10 | Status: SHIPPED | OUTPATIENT
Start: 2019-01-14 | End: 2019-01-22 | Stop reason: SDUPTHER

## 2019-01-22 ENCOUNTER — OFFICE VISIT (OUTPATIENT)
Dept: NEUROLOGY | Facility: CLINIC | Age: 34
End: 2019-01-22

## 2019-01-22 VITALS — WEIGHT: 101 LBS | BODY MASS INDEX: 18.58 KG/M2 | HEIGHT: 62 IN

## 2019-01-22 DIAGNOSIS — F79 MENTAL RETARDATION: ICD-10-CM

## 2019-01-22 DIAGNOSIS — G80.8 OTHER CEREBRAL PALSY (HCC): ICD-10-CM

## 2019-01-22 DIAGNOSIS — G40.909 SEIZURE DISORDER (HCC): Primary | ICD-10-CM

## 2019-01-22 PROCEDURE — 99213 OFFICE O/P EST LOW 20 MIN: CPT | Performed by: PSYCHIATRY & NEUROLOGY

## 2019-01-22 RX ORDER — TOPIRAMATE 50 MG/1
50 TABLET, FILM COATED ORAL 2 TIMES DAILY
Qty: 60 TABLET | Refills: 11 | Status: SHIPPED | OUTPATIENT
Start: 2019-01-22

## 2019-01-22 RX ORDER — TOPIRAMATE 25 MG/1
25 TABLET ORAL 2 TIMES DAILY
Qty: 60 TABLET | Refills: 11 | Status: SHIPPED | OUTPATIENT
Start: 2019-01-22 | End: 2020-01-22

## 2019-01-22 RX ORDER — OXCARBAZEPINE 600 MG/1
900 TABLET, FILM COATED ORAL 2 TIMES DAILY
Qty: 90 TABLET | Refills: 11 | Status: SHIPPED | OUTPATIENT
Start: 2019-01-22

## 2019-01-22 RX ORDER — DIAZEPAM 20 MG/4ML
20 GEL RECTAL ONCE
Qty: 1 EACH | Refills: 4 | Status: SHIPPED | OUTPATIENT
Start: 2019-01-22 | End: 2019-01-22

## 2019-01-22 NOTE — PROGRESS NOTES
Subjective:     Patient ID: Paradise Catherine is a 33 y.o. female.    History of Present Illness    Patient's seizures have not changed much.  She has one or 2 generalized tonic-clonic seizures a year.  Once or twice a week she will have a brief nocturnal partial seizure lasting 20 seconds or so.  She is on oxcarbazepine 900 mg twice daily and topiramate 75 mg twice daily.  She has Diastat 20 mg as a backup.  History was taken from both parents as well as.    The following portions of the patient's history were reviewed and updated as appropriate: allergies, current medications, past family history, past medical history, past social history, past surgical history and problem list.      Current Outpatient Medications:   •  acetaminophen (TYLENOL) 325 MG tablet, Take 1 tablet by mouth Every 6 (Six) Hours As Needed for Mild Pain ., Disp: 30 tablet, Rfl: 11  •  BD PEN NEEDLE SCOTTIE U/F 32G X 4 MM misc, USE 5 TIMES A DAY, Disp: 100 each, Rfl: 2  •  Cholecalciferol 2000 UNITS capsule, Take 5,000 Units by mouth., Disp: , Rfl:   •  Diapers & Supplies misc, Depends pull-ups in small/medium.  Use as needed.  Needs 2 cases every other month., Disp: 2 each, Rfl: 5  •  DIASTAT ACUDIAL 20 MG rectal kit, Insert 20 mg into the rectum 1 (One) Time for 1 dose., Disp: 1 each, Rfl: 4  •  diphenhydrAMINE (BENADRYL) 25 MG tablet, Take 1 tablet by mouth Every 6 (Six) Hours As Needed for Itching., Disp: 30 tablet, Rfl: 11  •  FLUoxetine (PROzac) 20 MG capsule, Take 1 capsule by mouth Daily., Disp: 30 capsule, Rfl: 11  •  glucagon (GLUCAGEN) 1 MG injection, Inject 1 mg into the shoulder, thigh, or buttocks 1 (One) Time As Needed for Low Blood Sugar for up to 1 dose., Disp: 2 kit, Rfl: 6  •  glucose blood (ONETOUCH VERIO) test strip, CHECK GLUCOSE BEFORE LUNCH AND AS NEEDED, Disp: 200 each, Rfl: 3  •  insulin aspart (NOVOLOG FLEXPEN) 100 UNIT/ML solution pen-injector sc pen, 8 UNITS TIDAC SC, Disp: 5 pen, Rfl: 10  •  insulin detemir (LEVEMIR  FLEXTOUCH) 100 UNIT/ML injection, 20 UNITS TWICE DAILY, Disp: 5 pen, Rfl: 10  •  levocetirizine (XYZAL) 5 MG tablet, Take 5 mg by mouth every evening., Disp: , Rfl:   •  ONETOUCH VERIO test strip, USE ONE STRIP TO TEST FIVE TIMES A DAY, Disp: 100 each, Rfl: 1  •  OXcarbazepine (TRILEPTAL) 600 MG tablet, Take 1.5 tablets by mouth 2 (Two) Times a Day., Disp: 90 tablet, Rfl: 11  •  SUNSCREEN SPF50 lotion, Apply generously every 2 hours while outside., Disp: 1 bottle, Rfl: 11  •  topiramate (TOPAMAX) 25 MG tablet, Take 1 tablet by mouth 2 (Two) Times a Day., Disp: 60 tablet, Rfl: 11  •  topiramate (TOPAMAX) 50 MG tablet, Take 1 tablet by mouth 2 (Two) Times a Day., Disp: 60 tablet, Rfl: 11    Review of Systems   Constitutional: Negative.    Neurological: Positive for numbness. Negative for dizziness, tremors, seizures, syncope, facial asymmetry, speech difficulty, weakness, light-headedness and headaches.   Psychiatric/Behavioral: Negative.         Objective:    Neurologic Exam  The patient is mentally handicapped.   Funduscopy, eye movements and pupillary reflexes were normal.  Visual fields were symmetric to opticokinetic nystagmus tape  No facial weakness noted.  No pattern of focal weakness.  Gait was unremarkable.  Physical Exam    Assessment/Plan:     Paradise was seen today for seizures and cerebral palsy.    Diagnoses and all orders for this visit:    Seizure disorder (CMS/HCC)    Other cerebral palsy (CMS/HCC)    Mental retardation    Other orders  -     topiramate (TOPAMAX) 25 MG tablet; Take 1 tablet by mouth 2 (Two) Times a Day.  -     topiramate (TOPAMAX) 50 MG tablet; Take 1 tablet by mouth 2 (Two) Times a Day.  -     OXcarbazepine (TRILEPTAL) 600 MG tablet; Take 1.5 tablets by mouth 2 (Two) Times a Day.  -     DIASTAT ACUDIAL 20 MG rectal kit; Insert 20 mg into the rectum 1 (One) Time for 1 dose.         Prescription drug management - meds as above    Follow-up in the office in one year. Thank you for  allowing me to share in the care of this patient.  Mickey Vyas M.D.

## 2019-02-12 DIAGNOSIS — E10.8 TYPE 1 DIABETES MELLITUS WITH COMPLICATIONS (HCC): ICD-10-CM

## 2019-02-13 RX ORDER — BLOOD SUGAR DIAGNOSTIC
STRIP MISCELLANEOUS
Qty: 100 EACH | Refills: 3 | Status: SHIPPED | OUTPATIENT
Start: 2019-02-13 | End: 2019-05-10 | Stop reason: SDUPTHER

## 2019-03-07 ENCOUNTER — OFFICE VISIT (OUTPATIENT)
Dept: ENDOCRINOLOGY | Age: 34
End: 2019-03-07

## 2019-03-07 VITALS
SYSTOLIC BLOOD PRESSURE: 110 MMHG | WEIGHT: 102 LBS | DIASTOLIC BLOOD PRESSURE: 72 MMHG | HEIGHT: 62 IN | HEART RATE: 82 BPM | BODY MASS INDEX: 18.77 KG/M2

## 2019-03-07 DIAGNOSIS — G40.909 SEIZURE DISORDER (HCC): ICD-10-CM

## 2019-03-07 DIAGNOSIS — IMO0002 DIABETES MELLITUS TYPE 1, UNCONTROLLED, WITH COMPLICATIONS: ICD-10-CM

## 2019-03-07 DIAGNOSIS — E55.9 VITAMIN D DEFICIENCY: ICD-10-CM

## 2019-03-07 DIAGNOSIS — E10.8 TYPE 1 DIABETES MELLITUS WITH COMPLICATIONS (HCC): Primary | ICD-10-CM

## 2019-03-07 DIAGNOSIS — F79 MENTAL RETARDATION: ICD-10-CM

## 2019-03-07 DIAGNOSIS — Z79.4 ENCOUNTER FOR LONG-TERM (CURRENT) USE OF INSULIN (HCC): ICD-10-CM

## 2019-03-07 DIAGNOSIS — E16.1 HYPERINSULINISM: ICD-10-CM

## 2019-03-07 PROCEDURE — 99214 OFFICE O/P EST MOD 30 MIN: CPT | Performed by: INTERNAL MEDICINE

## 2019-03-07 RX ORDER — DIAZEPAM 20 MG/4ML
GEL RECTAL
COMMUNITY
End: 2019-12-09

## 2019-03-07 NOTE — PROGRESS NOTES
33 y.o.    Patient Care Team:  Drew Blanchard MD as PCP - General (Family Medicine)  Angel Roque MD as PCP - Family Medicine  Giovanni Lewis MD as Consulting Physician (Endocrinology)  Mickey Vyas Jr., MD as Consulting Physician (Neurology)  Migue Echavarria MD as Consulting Physician (Allergy)    Chief Complaint:      F/U TYPE 1 DIABETES, UNCONTROLLED.  NO RECENT LABS.  Subjective     HPI   Patient is a 33-year-old white female with a history of severe mental retardation, cerebral palsy, seizure disorder and behavioral problems with uncontrolled type 1 diabetes mellitus since age 19 came for follow-up  Patient is accompanied by both parents were caregivers    Patient is currently taking Levemir twice daily and NovoLog 1 unit for every 15 carbs and correction of 1 unit for every 40/140  Patient spends time at the  during the day and insulin at lunchtime is given by the  staff  Most of the blood sugars appear to be between   Caregivers report that majority of the blood sugars appear to be better  She still has occasional hypoglycemia and hyperglycemia  There is no history of diabetic retinopathy or nephropathy or neuropathy  Hemoglobin A1c has always been low which is not consistent with the blood sugars  Her fructosamine levels were elevated suggesting hemoglobin A1c may be falsely low in her    Hypoglycemia  Patient continues to have hypoglycemia but much less incidence than before  Caregivers and nursing home staff are comfortable with treating hypoglycemia  Vitamin D deficiency  Patient currently takes 5000 units of vitamin D3 daily.        Interval History    Patient was brought in by both parents who are the primary historians since patient is noncommunicative.  They reported that patient was diabetic for the past 10 years, has been on different insulin regimens, a few years ago started Levemir and NovoLog, at that time they were seeing an endocrinologist  in [place].  She gets 29 units of l Levemir in the morning and 5 units at night, NovoLog 1 unit for every 15 carbs and 1 unit for every 30-50 mg a blood sugar over 150 for correction.  They reported usual hypoglycemia in the early morning hours, and higher blood sugars in the 200-250 range at dinnertime or suppertime. Patient apparently spends most of the day at the , and sometimes she does not get NovoLog for lunch at the . Apparently the staff decide to give insulin or not give insulin based on patient's behavior and blood sugar.  both parents denied any knowledge of diabetic retinopathy, nephropathy, neuropathy   Does not denied any knowledge of thyroid disorder  Mother is trying to get her eye examination done sometime this year  Patient is mostly nonverbal, gives only a momentary eye contact.  According to the parents patient has done relatively well since her last adjustment of insulin dose  Most of the blood sugars have been less than 200 at home. The  center did not send any Accu-Chek log this time.   Parents report that patient developed a rash with severe itching over the past several months and has been on prednisone on and off. Currently she is taking 10 mg prednisone taper and will probably on this medication for several weeks. Patient's blood sugars have been elevated in the 200-300 range and they have been increasing the insulin and gradually.  She is already seeing the dermatologist and 2 allergy specialists with no clear etiology identified. Medication list was reviewed but did not seem specifically connected to the rash.  Interval history  Mother reported that since they decided Levemir and NovoLog were not causing the rash as per the allergist she started going back on these medications. She has been on prednisone for the past several months and her blood sugars have been in the 200-400 range. They started tapering the prednisone and currently she is taking 5 mg daily and  will soon start taking 5 mg every other day        The following portions of the patient's history were reviewed and updated as appropriate: allergies, current medications, past family history, past medical history, past social history, past surgical history and problem list.    Past Medical History:   Diagnosis Date   • Cerebral palsy (CMS/HCC)     birth   • Diabetes mellitus (CMS/HCC)    • Diabetes mellitus type I (CMS/HCC)    • Hives     chronic   • Hives    • Seizures (CMS/HCC)      Family History   Problem Relation Age of Onset   • Hypertension Mother    • Hyperlipidemia Mother    • Seasonal affective disorder Father      Social History     Socioeconomic History   • Marital status: Single     Spouse name: Not on file   • Number of children: Not on file   • Years of education: Not on file   • Highest education level: Not on file   Social Needs   • Financial resource strain: Not on file   • Food insecurity - worry: Not on file   • Food insecurity - inability: Not on file   • Transportation needs - medical: Not on file   • Transportation needs - non-medical: Not on file   Occupational History   • Not on file   Tobacco Use   • Smoking status: Never Smoker   • Smokeless tobacco: Never Used   Substance and Sexual Activity   • Alcohol use: No   • Drug use: Not on file   • Sexual activity: No   Other Topics Concern   • Not on file   Social History Narrative   • Not on file     No Known Allergies    Current Outpatient Medications:   •  acetaminophen (TYLENOL) 325 MG tablet, Take 1 tablet by mouth Every 6 (Six) Hours As Needed for Mild Pain ., Disp: 30 tablet, Rfl: 11  •  BD PEN NEEDLE SCOTTIE U/F 32G X 4 MM misc, USE 5 TIMES A DAY, Disp: 100 each, Rfl: 2  •  Cholecalciferol 2000 UNITS capsule, Take 5,000 Units by mouth., Disp: , Rfl:   •  Diapers & Supplies misc, Depends pull-ups in small/medium.  Use as needed.  Needs 2 cases every other month., Disp: 2 each, Rfl: 5  •  diphenhydrAMINE (BENADRYL) 25 MG tablet, Take 1  "tablet by mouth Every 6 (Six) Hours As Needed for Itching., Disp: 30 tablet, Rfl: 11  •  FLUoxetine (PROzac) 20 MG capsule, Take 1 capsule by mouth Daily., Disp: 30 capsule, Rfl: 11  •  glucagon (GLUCAGEN) 1 MG injection, Inject 1 mg into the shoulder, thigh, or buttocks 1 (One) Time As Needed for Low Blood Sugar for up to 1 dose., Disp: 2 kit, Rfl: 6  •  glucose blood (ONETOUCH VERIO) test strip, CHECK GLUCOSE BEFORE LUNCH AND AS NEEDED, Disp: 200 each, Rfl: 3  •  insulin aspart (NOVOLOG FLEXPEN) 100 UNIT/ML solution pen-injector sc pen, INJECT UNDER THE SKIN 8 UNITS THREE TIMES A DAY BEFORE MEALS, Disp: 15 mL, Rfl: 9  •  insulin detemir (LEVEMIR FLEXTOUCH) 100 UNIT/ML injection, 20 UNITS TWICE DAILY, Disp: 5 pen, Rfl: 10  •  levocetirizine (XYZAL) 5 MG tablet, Take 5 mg by mouth every evening., Disp: , Rfl:   •  ONETOUCH VERIO test strip, USE TO TEST BLOOD SUGAR 5 TIMES DAILY, Disp: 100 each, Rfl: 3  •  OXcarbazepine (TRILEPTAL) 600 MG tablet, Take 1.5 tablets by mouth 2 (Two) Times a Day., Disp: 90 tablet, Rfl: 11  •  SUNSCREEN SPF50 lotion, Apply generously every 2 hours while outside., Disp: 1 bottle, Rfl: 11  •  topiramate (TOPAMAX) 25 MG tablet, Take 1 tablet by mouth 2 (Two) Times a Day., Disp: 60 tablet, Rfl: 11  •  topiramate (TOPAMAX) 50 MG tablet, Take 1 tablet by mouth 2 (Two) Times a Day., Disp: 60 tablet, Rfl: 11        Review of Systems   Constitutional: Negative for chills, fatigue and fever.   Cardiovascular: Negative for chest pain and palpitations.   Gastrointestinal: Negative for abdominal pain, constipation, diarrhea, nausea and vomiting.   Endocrine: Negative for cold intolerance and heat intolerance.   All other systems reviewed and are negative.      Objective       Vitals:    03/07/19 1508   BP: 110/72   Pulse: 82   Weight: 46.3 kg (102 lb)   Height: 157.5 cm (62\")     Body mass index is 18.66 kg/m².      Physical Exam   Eyes: EOM are normal. Pupils are equal, round, and reactive to light. "   Neck: Normal range of motion. Neck supple. No thyromegaly present.   Cardiovascular: Normal rate, regular rhythm, normal heart sounds and intact distal pulses.   Pulmonary/Chest: Effort normal and breath sounds normal.   Abdominal: Soft. Bowel sounds are normal. She exhibits no distension. There is no tenderness.   Musculoskeletal: Normal range of motion. She exhibits no edema.   Neurological: She is alert.   Skin: Skin is warm and dry.   Psychiatric: Her behavior is normal.   Nursing note and vitals reviewed.    Results Review:     I reviewed the patient's new clinical results.    Medical records reviewed  Summary:      Office Visit on 11/06/2018   Component Date Value Ref Range Status   • Glucose 11/06/2018 383* 65 - 99 mg/dL Final   • BUN 11/06/2018 19  6 - 20 mg/dL Final   • Creatinine 11/06/2018 0.80  0.57 - 1.00 mg/dL Final   • eGFR Non  Am 11/06/2018 83  >60 mL/min/1.73 Final   • eGFR African Am 11/06/2018 101  >60 mL/min/1.73 Final   • BUN/Creatinine Ratio 11/06/2018 23.8  7.0 - 25.0 Final   • Sodium 11/06/2018 141  136 - 145 mmol/L Final   • Potassium 11/06/2018 4.5  3.5 - 5.2 mmol/L Final   • Chloride 11/06/2018 106  98 - 107 mmol/L Final   • Total CO2 11/06/2018 24.2  22.0 - 29.0 mmol/L Final   • Calcium 11/06/2018 9.1  8.6 - 10.5 mg/dL Final   • Total Protein 11/06/2018 7.0  6.0 - 8.5 g/dL Final   • Albumin 11/06/2018 3.90  3.50 - 5.20 g/dL Final   • Globulin 11/06/2018 3.1  gm/dL Final   • A/G Ratio 11/06/2018 1.3  g/dL Final   • Total Bilirubin 11/06/2018 0.2  0.1 - 1.2 mg/dL Final   • Alkaline Phosphatase 11/06/2018 80  39 - 117 U/L Final   • AST (SGOT) 11/06/2018 23  1 - 32 U/L Final   • ALT (SGPT) 11/06/2018 29  1 - 33 U/L Final   • Hemoglobin A1C 11/06/2018 6.88* 4.80 - 5.60 % Final    Comment: Hemoglobin A1C Ranges:  Increased Risk for Diabetes  5.7% to 6.4%  Diabetes                     >= 6.5%  Diabetic Goal                < 7.0%     • TSH 11/06/2018 0.894  0.270 - 4.200 mIU/mL Final    • 25 Hydroxy, Vitamin D 11/06/2018 113.8* 30.0 - 100.0 ng/ml Final    Comment: Reference Range for Total Vitamin D 25(OH)  Deficiency    <20.0 ng/mL  Insufficiency 21-29 ng/mL  Sufficiency    ng/mL  Toxicity      >100 ng/ml        • Fructosamine 11/06/2018 395* 0 - 285 umol/L Final    Comment: Published reference interval for apparently healthy subjects  between age 20 and 60 is 205 - 285 umol/L and in a poorly  controlled diabetic population is 228 - 563 umol/L with a  mean of 396 umol/L.       Lab Results   Component Value Date    HGBA1C 6.88 (H) 11/06/2018    HGBA1C 7.10 (H) 06/25/2018    HGBA1C 6.40 (H) 02/19/2018     Lab Results   Component Value Date    CREATININE 0.80 11/06/2018     Imaging Results (most recent)     None                            Assessment and Plan:    Paradise was seen today for diabetes.    Diagnoses and all orders for this visit:    Type 1 diabetes mellitus with complications (CMS/[facility])  -     Comprehensive Metabolic Panel  -     Hemoglobin A1c  -     TSH  -     Microalbumin / Creatinine Urine Ratio - Urine, Clean Catch  -     Vitamin D 25 Hydroxy    Vitamin D deficiency  -     Comprehensive Metabolic Panel  -     Hemoglobin A1c  -     TSH  -     Microalbumin / Creatinine Urine Ratio - Urine, Clean Catch  -     Vitamin D 25 Hydroxy    Diabetes mellitus type 1, uncontrolled, with complications (CMS/[facility])    Hyperinsulinism    Encounter for long-term (current) use of insulin (CMS/[facility])    Mental retardation    Seizure disorder (CMS/[facility])    Other orders  -     Unable To Void       #1 uncontrolled type I diabetes mellitus with the last hemoglobin A1c was 6.9%   #2 hypoglycemia  #3 seizure disorder currently on medication but apparently hypoglycemia is the trigger, and they are trying to avoid that.  #4 vitamin D deficiency patient is currently taking 1000 units daily     Patient's glucometer readings reviewed  Patient is checking 4-5 times daily  She currently takes 4 injections of insulin  "daily    Majority are in the  range  Parents report that his blood sugars have been more stable since last visit  No frequent hypoglycemia    Patient is currently trying 5 units of NovoLog for breakfast  Fructosamine levels were also slightly better than last visit    Patient is currently taking Levemir 15 units twice daily  She will continue NovoLog 1 unit for every 15 carbs and 1 unit for every 40/140 correction  Incidence of hypoglycemia has diminished    Patient has not heard from [facility] yet  Family are still interested    Patient will get lab testing done today  She will return to follow-up in 4 months.    The total time spent  was more than 25 min of which greater than 15 min of time (greater than 50% of the total time)  was spent face to face with the patient counseling and coordination of care on recommended evaluation and treatment options, instructions for management/treatment and /or follow up and importance of compliance with chosen management or treatment options    Giovanni Lewis MD. FACE    03/07/19      EMR Dragon / transcription disclaimer:     \"Dictated utilizing Dragon dictation\".         "

## 2019-03-08 LAB
25(OH)D3+25(OH)D2 SERPL-MCNC: 81.3 NG/ML (ref 30–100)
ALBUMIN SERPL-MCNC: 3.9 G/DL (ref 3.5–5.2)
ALBUMIN/GLOB SERPL: 1.4 G/DL
ALP SERPL-CCNC: 81 U/L (ref 39–117)
ALT SERPL-CCNC: 21 U/L (ref 1–33)
AST SERPL-CCNC: 20 U/L (ref 1–32)
BILIRUB SERPL-MCNC: <0.2 MG/DL (ref 0.1–1.2)
BUN SERPL-MCNC: 17 MG/DL (ref 6–20)
BUN/CREAT SERPL: 27 (ref 7–25)
CALCIUM SERPL-MCNC: 8.9 MG/DL (ref 8.6–10.5)
CHLORIDE SERPL-SCNC: 105 MMOL/L (ref 98–107)
CO2 SERPL-SCNC: 22.9 MMOL/L (ref 22–29)
CREAT SERPL-MCNC: 0.63 MG/DL (ref 0.57–1)
GLOBULIN SER CALC-MCNC: 2.8 GM/DL
GLUCOSE SERPL-MCNC: 157 MG/DL (ref 65–99)
HBA1C MFR BLD: 6.9 % (ref 4.8–5.6)
POTASSIUM SERPL-SCNC: 4 MMOL/L (ref 3.5–5.2)
PROT SERPL-MCNC: 6.7 G/DL (ref 6–8.5)
SODIUM SERPL-SCNC: 137 MMOL/L (ref 136–145)
TSH SERPL DL<=0.005 MIU/L-ACNC: 1.05 MIU/ML (ref 0.27–4.2)
UNABLE TO VOID: NORMAL

## 2019-03-09 DIAGNOSIS — E10.8 TYPE 1 DIABETES MELLITUS WITH COMPLICATIONS (HCC): ICD-10-CM

## 2019-03-12 RX ORDER — INSULIN DETEMIR 100 [IU]/ML
INJECTION, SOLUTION SUBCUTANEOUS
Qty: 15 ML | Refills: 9 | Status: SHIPPED | OUTPATIENT
Start: 2019-03-12 | End: 2020-03-12

## 2019-03-16 DIAGNOSIS — E10.8 TYPE 1 DIABETES MELLITUS WITH COMPLICATIONS (HCC): ICD-10-CM

## 2019-03-18 RX ORDER — PEN NEEDLE, DIABETIC 32GX 5/32"
NEEDLE, DISPOSABLE MISCELLANEOUS
Qty: 100 EACH | Refills: 1 | Status: SHIPPED | OUTPATIENT
Start: 2019-03-18 | End: 2019-05-07 | Stop reason: SDUPTHER

## 2019-04-22 ENCOUNTER — PRIOR AUTHORIZATION (OUTPATIENT)
Dept: ENDOCRINOLOGY | Age: 34
End: 2019-04-22

## 2019-04-22 ENCOUNTER — TELEPHONE (OUTPATIENT)
Dept: ENDOCRINOLOGY | Age: 34
End: 2019-04-22

## 2019-04-22 NOTE — TELEPHONE ENCOUNTER
"----- Message from Whitney Muñoz sent at 4/22/2019  1:30 PM EDT -----  Contact: DILLAN GRIMALDO   Ultra fine tamiko needles 32GX 5-32\"   Not required   04-      CHARTED    "

## 2019-05-07 DIAGNOSIS — E10.8 TYPE 1 DIABETES MELLITUS WITH COMPLICATIONS (HCC): ICD-10-CM

## 2019-05-08 RX ORDER — PEN NEEDLE, DIABETIC 30 GX3/16"
NEEDLE, DISPOSABLE MISCELLANEOUS
Qty: 200 EACH | Refills: 11 | Status: SHIPPED | OUTPATIENT
Start: 2019-05-08 | End: 2020-04-09 | Stop reason: SDUPTHER

## 2019-05-10 DIAGNOSIS — E10.8 TYPE 1 DIABETES MELLITUS WITH COMPLICATIONS (HCC): ICD-10-CM

## 2019-05-11 RX ORDER — BLOOD SUGAR DIAGNOSTIC
STRIP MISCELLANEOUS
Qty: 100 EACH | Refills: 2 | Status: SHIPPED | OUTPATIENT
Start: 2019-05-11 | End: 2019-07-18 | Stop reason: SDUPTHER

## 2019-05-16 ENCOUNTER — OFFICE VISIT (OUTPATIENT)
Dept: INTERNAL MEDICINE | Facility: CLINIC | Age: 34
End: 2019-05-16

## 2019-05-16 VITALS
DIASTOLIC BLOOD PRESSURE: 70 MMHG | BODY MASS INDEX: 17.89 KG/M2 | HEIGHT: 62 IN | OXYGEN SATURATION: 98 % | HEART RATE: 92 BPM | RESPIRATION RATE: 18 BRPM | WEIGHT: 97.2 LBS | SYSTOLIC BLOOD PRESSURE: 110 MMHG | TEMPERATURE: 99 F

## 2019-05-16 DIAGNOSIS — Z00.00 ANNUAL PHYSICAL EXAM: Primary | ICD-10-CM

## 2019-05-16 PROBLEM — F81.9 MENTAL DEVELOPMENTAL DELAY: Status: ACTIVE | Noted: 2019-02-28

## 2019-05-16 PROBLEM — Z79.899 LONG-TERM USE OF HIGH-RISK MEDICATION: Status: ACTIVE | Noted: 2019-02-28

## 2019-05-16 PROBLEM — H10.022 PINK EYE DISEASE OF LEFT EYE: Status: RESOLVED | Noted: 2017-09-06 | Resolved: 2019-05-16

## 2019-05-16 PROCEDURE — 99395 PREV VISIT EST AGE 18-39: CPT | Performed by: FAMILY MEDICINE

## 2019-05-16 RX ORDER — ACETAMINOPHEN 325 MG/1
325 TABLET ORAL EVERY 6 HOURS PRN
Qty: 30 TABLET | Refills: 11 | Status: SHIPPED | OUTPATIENT
Start: 2019-05-16 | End: 2020-06-29 | Stop reason: SDUPTHER

## 2019-05-16 RX ORDER — DIPHENHYDRAMINE HCL 25 MG
25 TABLET ORAL EVERY 6 HOURS PRN
Qty: 30 TABLET | Refills: 11 | Status: SHIPPED | OUTPATIENT
Start: 2019-05-16 | End: 2019-05-16 | Stop reason: SDUPTHER

## 2019-05-16 RX ORDER — ACETAMINOPHEN 325 MG/1
325 TABLET ORAL EVERY 6 HOURS PRN
Qty: 30 TABLET | Refills: 11 | Status: SHIPPED | OUTPATIENT
Start: 2019-05-16 | End: 2019-05-16 | Stop reason: SDUPTHER

## 2019-05-16 RX ORDER — DIPHENHYDRAMINE HCL 25 MG
25 TABLET ORAL EVERY 6 HOURS PRN
Qty: 30 TABLET | Refills: 11 | Status: SHIPPED | OUTPATIENT
Start: 2019-05-16 | End: 2020-06-29 | Stop reason: SDUPTHER

## 2019-05-16 NOTE — PROGRESS NOTES
Patient Name: Paradise Catherine    SUBJECTIVE    Paradise is a 34 y.o. female presenting for Annual Exam      Well Adult Physical   Patient here for a comprehensive physical exam.The patient reports no problems    Do you take any herbs or supplements that were not prescribed by a doctor? no   Are you taking calcium supplements? no   Are you taking aspirin daily? no     History:  Any STD's in the past? none    Paradise Catherine 34 y.o. female who presents for an Annual Wellness Visit.  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  she has been doing well with new interval problems.      Health Habits:  Dental Exam. up to date  Eye Exam. up to date  Exercise: 5 times/week.  Current exercise activities include: walking      The following portions of the patient's history were reviewed and updated as appropriate: allergies, current medications, past family history, past medical history, past social history, past surgical history and problem list.    Review of Systems   Constitutional: Negative.    HENT: Negative.    Eyes: Negative.    Respiratory: Negative.    Cardiovascular: Negative.    Gastrointestinal: Negative.    Endocrine: Negative.    Genitourinary: Negative.    Musculoskeletal: Negative.    Skin: Negative.    Allergic/Immunologic: Positive for environmental allergies.   Neurological: Negative.  Negative for seizures (controlled).   Hematological: Negative.    Psychiatric/Behavioral: Negative.        Patient has  no known allergies.      Current Outpatient Medications:   •  acetaminophen (TYLENOL) 325 MG tablet, Take 1 tablet by mouth Every 6 (Six) Hours As Needed for Mild Pain ., Disp: 30 tablet, Rfl: 11  •  Cholecalciferol 2000 UNITS capsule, Take 5,000 Units by mouth., Disp: , Rfl:   •  Diapers & Supplies misc, Depends pull-ups in small/medium.  Use as needed.  Needs 2 cases every other month., Disp: 2 each, Rfl: 5  •  diazepam (DIASTAT ACUDIAL) 20 MG rectal kit, Insert  into the rectum., Disp: , Rfl:   •  diphenhydrAMINE (BENADRYL) 25 MG tablet, Take 1 tablet by mouth Every 6 (Six) Hours As Needed for Itching., Disp: 30 tablet, Rfl: 11  •  FLUoxetine (PROzac) 20 MG capsule, Take 1 capsule by mouth Daily., Disp: 30 capsule, Rfl: 11  •  glucagon (GLUCAGEN) 1 MG injection, Inject 1 mg into the shoulder, thigh, or buttocks 1 (One) Time As Needed for Low Blood Sugar for up to 1 dose., Disp: 2 kit, Rfl: 6  •  glucose blood (ONETOUCH VERIO) test strip, CHECK GLUCOSE BEFORE LUNCH AND AS NEEDED, Disp: 200 each, Rfl: 3  •  insulin aspart (NOVOLOG FLEXPEN) 100 UNIT/ML solution pen-injector sc pen, INJECT UNDER THE SKIN 8 UNITS THREE TIMES A DAY BEFORE MEALS, Disp: 15 mL, Rfl: 9  •  Insulin Pen Needle (PEN NEEDLES) 32G X 4 MM misc, USE 5 TIMES PER DAY, Disp: 200 each, Rfl: 11  •  LEVEMIR FLEXTOUCH 100 UNIT/ML injection, INJECT UNDER THE SKIN 20 UNITS TWO TIMES A DAY, Disp: 15 mL, Rfl: 9  •  levocetirizine (XYZAL) 5 MG tablet, Take 5 mg by mouth every evening., Disp: , Rfl:   •  ONETOUCH VERIO test strip, USE STRIP TO TEST FIVE TIMES A DAY, Disp: 100 each, Rfl: 2  •  OXcarbazepine (TRILEPTAL) 600 MG tablet, Take 1.5 tablets by mouth 2 (Two) Times a Day., Disp: 90 tablet, Rfl: 11  •  SUNSCREEN SPF50 lotion, Apply generously every 2 hours while outside., Disp: 1 bottle, Rfl: 11  •  topiramate (TOPAMAX) 25 MG tablet, Take 1 tablet by mouth 2 (Two) Times a Day., Disp: 60 tablet, Rfl: 11  •  topiramate (TOPAMAX) 50 MG tablet, Take 1  "tablet by mouth 2 (Two) Times a Day., Disp: 60 tablet, Rfl: 11    OBJECTIVE    /70 (BP Location: Left arm, Patient Position: Sitting, Cuff Size: Adult)   Pulse 92   Temp 99 °F (37.2 °C) (Temporal)   Resp 18   Ht 157.5 cm (62\")   Wt 44.1 kg (97 lb 3.2 oz)   SpO2 98%   BMI 17.78 kg/m²     Physical Exam   Constitutional: She is oriented to person, place, and time. She appears well-developed and well-nourished.   HENT:   Head: Normocephalic and atraumatic.   Right Ear: External ear normal.   Left Ear: External ear normal.   Nose: Nose normal.   Mouth/Throat: Oropharynx is clear and moist.   Eyes: Conjunctivae and EOM are normal. Pupils are equal, round, and reactive to light. No scleral icterus.   Neck: Normal range of motion. Neck supple. No thyromegaly present.   Cardiovascular: Normal rate, regular rhythm, normal heart sounds and intact distal pulses. Exam reveals no gallop and no friction rub.   No murmur heard.  Pulmonary/Chest: Effort normal and breath sounds normal. No respiratory distress. She has no wheezes. She has no rales.   Abdominal: Soft. Bowel sounds are normal. There is no hepatosplenomegaly.   Musculoskeletal: She exhibits no edema or deformity.   Lymphadenopathy:     She has no cervical adenopathy.   Neurological: She is alert and oriented to person, place, and time. She has normal reflexes. She displays normal reflexes. No cranial nerve deficit. She exhibits normal muscle tone. Coordination normal.   Skin: Skin is warm and dry. No rash noted.   Psychiatric: She has a normal mood and affect. Her behavior is normal. Judgment and thought content normal.   Nursing note and vitals reviewed.          ASSESSMENT AND PLAN  Update vaccines if indicated.    continue current medications, continue current healthy lifestyle patterns and return for routine annual checkups    Paradise was seen today for annual exam.    Diagnoses and all orders for this visit:    Annual physical exam    Other orders  -     " Discontinue: acetaminophen (TYLENOL) 325 MG tablet; Take 1 tablet by mouth Every 6 (Six) Hours As Needed for Mild Pain .  -     Discontinue: diphenhydrAMINE (BENADRYL) 25 MG tablet; Take 1 tablet by mouth Every 6 (Six) Hours As Needed for Itching.  -     diphenhydrAMINE (BENADRYL) 25 MG tablet; Take 1 tablet by mouth Every 6 (Six) Hours As Needed for Itching.  -     acetaminophen (TYLENOL) 325 MG tablet; Take 1 tablet by mouth Every 6 (Six) Hours As Needed for Mild Pain .    Had hepatitis A vaccine last year.  Mom will check records for date of last tetanus vaccine.  Labs from Dr. Marks reviewed.      Return in about 1 year (around 5/16/2020).

## 2019-05-21 RX ORDER — FLUOXETINE HYDROCHLORIDE 20 MG/1
CAPSULE ORAL
Qty: 30 CAPSULE | Refills: 10 | Status: SHIPPED | OUTPATIENT
Start: 2019-05-21 | End: 2020-04-13

## 2019-06-10 ENCOUNTER — TELEPHONE (OUTPATIENT)
Dept: ENDOCRINOLOGY | Age: 34
End: 2019-06-10

## 2019-06-10 NOTE — TELEPHONE ENCOUNTER
Pt's mom  Alyson called and said that you told her she could call Dr Triana's office if she ever needs any assistance w/meds. She said that she needs some samples of LEVEMIR FLEXTOUCH 100 UNIT/ML injection. She is asking if you could give her a call to let her know if this is ok at [phone].

## 2019-06-19 DIAGNOSIS — IMO0002 DIABETES MELLITUS TYPE 1, UNCONTROLLED, WITH COMPLICATIONS: ICD-10-CM

## 2019-06-19 DIAGNOSIS — E10.8 TYPE 1 DIABETES MELLITUS WITH COMPLICATIONS (HCC): ICD-10-CM

## 2019-06-19 DIAGNOSIS — E16.1 HYPOGLYCEMIA DUE TO ENDOGENOUS HYPERINSULINEMIA: ICD-10-CM

## 2019-06-19 RX ORDER — BLOOD SUGAR DIAGNOSTIC
STRIP MISCELLANEOUS
Qty: 200 EACH | Refills: 3 | Status: SHIPPED | OUTPATIENT
Start: 2019-06-19 | End: 2020-01-23 | Stop reason: SDUPTHER

## 2019-06-27 ENCOUNTER — PRIOR AUTHORIZATION (OUTPATIENT)
Dept: ENDOCRINOLOGY | Age: 34
End: 2019-06-27

## 2019-07-18 DIAGNOSIS — E10.8 TYPE 1 DIABETES MELLITUS WITH COMPLICATIONS (HCC): ICD-10-CM

## 2019-07-19 RX ORDER — BLOOD SUGAR DIAGNOSTIC
STRIP MISCELLANEOUS
Qty: 100 EACH | Refills: 1 | Status: SHIPPED | OUTPATIENT
Start: 2019-07-19 | End: 2019-08-28 | Stop reason: SDUPTHER

## 2019-08-09 ENCOUNTER — OFFICE VISIT (OUTPATIENT)
Dept: ENDOCRINOLOGY | Age: 34
End: 2019-08-09

## 2019-08-09 VITALS
OXYGEN SATURATION: 99 % | SYSTOLIC BLOOD PRESSURE: 110 MMHG | BODY MASS INDEX: 18.22 KG/M2 | HEART RATE: 83 BPM | WEIGHT: 99 LBS | DIASTOLIC BLOOD PRESSURE: 70 MMHG | HEIGHT: 62 IN

## 2019-08-09 DIAGNOSIS — F79 MENTAL RETARDATION: ICD-10-CM

## 2019-08-09 DIAGNOSIS — G40.909 SEIZURE DISORDER (HCC): ICD-10-CM

## 2019-08-09 DIAGNOSIS — IMO0002 DIABETES MELLITUS TYPE 1, UNCONTROLLED, WITH COMPLICATIONS: Primary | ICD-10-CM

## 2019-08-09 DIAGNOSIS — Z79.4 ENCOUNTER FOR LONG-TERM (CURRENT) USE OF INSULIN (HCC): ICD-10-CM

## 2019-08-09 DIAGNOSIS — E16.1 HYPERINSULINISM: ICD-10-CM

## 2019-08-09 DIAGNOSIS — G80.8 OTHER CEREBRAL PALSY (HCC): ICD-10-CM

## 2019-08-09 PROCEDURE — 99214 OFFICE O/P EST MOD 30 MIN: CPT | Performed by: INTERNAL MEDICINE

## 2019-08-09 NOTE — PROGRESS NOTES
34 y.o.    Patient Care Team:  rDew Blanchard MD as PCP - General (Family Medicine)  Angel Roque MD as PCP - Family Medicine  Giovanni Lewis MD as Consulting Physician (Endocrinology)  Mickey Vyas Jr., MD as Consulting Physician (Neurology)  Migue Echavarria MD as Consulting Physician (Allergy)    Chief Complaint:    F/U TYPE 1 DIABETES, UNCONTROLLED.  NO RECENT LABS.     Subjective     HPI   Patient is a 34-year-old white female with a history of mental retardation, cerebral palsy and seizure disorder with behavioral problems and uncontrolled type 1 diabetes mellitus since age 19 came for follow-up  Patient is accompanied by her parents who are her caregivers  Patient is currently taking Levemir 15 units twice daily and NovoLog 1 unit for every 15 carbs and correction of 1 unit for every 40/140  Patient's blood sugars are mostly in the 100-300 range at home and at the  center they may be in the 400 range sometimes  The  staff are apparently giving her the insulin for lunch and the parents are afraid of hypoglycemia at   As a result her blood sugars are slightly higher at lunch and post lunch  Hypoglycemia  Patient has very few episodes of hypoglycemia over the past few months  There is no history of diabetic retinopathy or nephropathy or neuropathy  Hemoglobin A1c tends to be low but fructosamine levels were elevated  Vitamin D deficiency  Patient is currently taking 5000 units of vitamin D3 daily      Interval History    Patient was brought in by both parents who are the primary historians since patient is noncommunicative.  They reported that patient was diabetic for the past 10 years, has been on different insulin regimens, a few years ago started Levemir and NovoLog, at that time they were seeing an endocrinologist in [place].  She gets 29 units of l Levemir in the morning and 5 units at night, NovoLog 1 unit for every 15 carbs and 1 unit for every 30-50  mg a blood sugar over 150 for correction.  They reported usual hypoglycemia in the early morning hours, and higher blood sugars in the 200-250 range at dinnertime or suppertime. Patient apparently spends most of the day at the , and sometimes she does not get NovoLog for lunch at the . Apparently the staff decide to give insulin or not give insulin based on patient's behavior and blood sugar.  both parents denied any knowledge of diabetic retinopathy, nephropathy, neuropathy   Does not denied any knowledge of thyroid disorder  Mother is trying to get her eye examination done sometime this year  Patient is mostly nonverbal, gives only a momentary eye contact.  According to the parents patient has done relatively well since her last adjustment of insulin dose  Most of the blood sugars have been less than 200 at home. The  center did not send any Accu-Chek log this time.   Parents report that patient developed a rash with severe itching over the past several months and has been on prednisone on and off. Currently she is taking 10 mg prednisone taper and will probably on this medication for several weeks. Patient's blood sugars have been elevated in the 200-300 range and they have been increasing the insulin and gradually.  She is already seeing the dermatologist and 2 allergy specialists with no clear etiology identified. Medication list was reviewed but did not seem specifically connected to the rash.  Interval history  Mother reported that since they decided Levemir and NovoLog were not causing the rash as per the allergist she started going back on these medications. She has been on prednisone for the past several months and her blood sugars have been in the 200-400 range. They started tapering the prednisone and currently she is taking 5 mg daily and will soon start taking 5 mg every other day        The following portions of the patient's history were reviewed and updated as appropriate:  allergies, current medications, past family history, past medical history, past social history, past surgical history and problem list.    Past Medical History:   Diagnosis Date   • Cerebral palsy (CMS/HCC)     birth   • Diabetes mellitus (CMS/HCC)    • Diabetes mellitus type I (CMS/HCC)    • Hives     chronic   • Hives    • Seizures (CMS/HCC)      Family History   Problem Relation Age of Onset   • Hypertension Mother    • Hyperlipidemia Mother    • Seasonal affective disorder Father      Social History     Socioeconomic History   • Marital status: Single     Spouse name: Not on file   • Number of children: Not on file   • Years of education: Not on file   • Highest education level: Not on file   Tobacco Use   • Smoking status: Never Smoker   • Smokeless tobacco: Never Used   Substance and Sexual Activity   • Alcohol use: No   • Sexual activity: No     No Known Allergies    Current Outpatient Medications:   •  acetaminophen (TYLENOL) 325 MG tablet, Take 1 tablet by mouth Every 6 (Six) Hours As Needed for Mild Pain ., Disp: 30 tablet, Rfl: 11  •  Cholecalciferol 2000 UNITS capsule, Take 5,000 Units by mouth., Disp: , Rfl:   •  Diapers & Supplies misc, Depends pull-ups in small/medium.  Use as needed.  Needs 2 cases every other month., Disp: 2 each, Rfl: 5  •  diazepam (DIASTAT ACUDIAL) 20 MG rectal kit, Insert  into the rectum., Disp: , Rfl:   •  diphenhydrAMINE (BENADRYL) 25 MG tablet, Take 1 tablet by mouth Every 6 (Six) Hours As Needed for Itching., Disp: 30 tablet, Rfl: 11  •  FLUoxetine (PROzac) 20 MG capsule, TAKE ONE CAPSULE BY MOUTH DAILY, Disp: 30 capsule, Rfl: 10  •  glucagon (GLUCAGEN) 1 MG injection, Inject 1 mg into the shoulder, thigh, or buttocks 1 (One) Time As Needed for Low Blood Sugar for up to 1 dose., Disp: 2 kit, Rfl: 6  •  insulin aspart (NOVOLOG FLEXPEN) 100 UNIT/ML solution pen-injector sc pen, INJECT UNDER THE SKIN 8 UNITS THREE TIMES A DAY BEFORE MEALS, Disp: 15 mL, Rfl: 9  •  Insulin Pen  "Needle (PEN NEEDLES) 32G X 4 MM misc, USE 5 TIMES PER DAY, Disp: 200 each, Rfl: 11  •  LEVEMIR FLEXTOUCH 100 UNIT/ML injection, INJECT UNDER THE SKIN 20 UNITS TWO TIMES A DAY, Disp: 15 mL, Rfl: 9  •  levocetirizine (XYZAL) 5 MG tablet, Take 5 mg by mouth every evening., Disp: , Rfl:   •  ONETOUCH VERIO test strip, CHECK GLUCOSE BEFORE LUNCH AND AS NEEDED, Disp: 200 each, Rfl: 3  •  ONETOUCH VERIO test strip, USE ONE STRIP TO TEST FIVE TIMES A DAY, Disp: 100 each, Rfl: 1  •  OXcarbazepine (TRILEPTAL) 600 MG tablet, Take 1.5 tablets by mouth 2 (Two) Times a Day., Disp: 90 tablet, Rfl: 11  •  SUNSCREEN SPF50 lotion, Apply generously every 2 hours while outside., Disp: 1 bottle, Rfl: 11  •  topiramate (TOPAMAX) 25 MG tablet, Take 1 tablet by mouth 2 (Two) Times a Day., Disp: 60 tablet, Rfl: 11  •  topiramate (TOPAMAX) 50 MG tablet, Take 1 tablet by mouth 2 (Two) Times a Day., Disp: 60 tablet, Rfl: 11        Review of Systems   Constitutional: Negative for appetite change, fatigue and fever.   Eyes: Negative for visual disturbance.   Respiratory: Negative for shortness of breath.    Cardiovascular: Negative for palpitations and leg swelling.   Gastrointestinal: Negative for abdominal pain and vomiting.   Endocrine: Negative for polydipsia and polyuria.   Musculoskeletal: Negative for joint swelling and neck pain.   Skin: Negative for rash.   Neurological: Negative for weakness and numbness.   Psychiatric/Behavioral: Negative for behavioral problems.   All other systems reviewed and are negative.      Objective       Vitals:    08/09/19 1544   BP: 110/70   Pulse: 83   SpO2: 99%   Weight: 44.9 kg (99 lb)   Height: 157.5 cm (62\")     Body mass index is 18.11 kg/m².      Physical Exam   Eyes: EOM are normal. Pupils are equal, round, and reactive to light.   Neck: Normal range of motion. Neck supple. No thyromegaly present.   Cardiovascular: Normal rate, regular rhythm, normal heart sounds and intact distal pulses. "   Pulmonary/Chest: Effort normal and breath sounds normal.   Abdominal: Soft. Bowel sounds are normal. She exhibits no distension. There is no tenderness.   Musculoskeletal: Normal range of motion. She exhibits no edema.   Neurological: She is alert.   Skin: Skin is warm and dry.   Psychiatric: Her behavior is normal.   Nursing note and vitals reviewed.    Results Review:     I reviewed the patient's new clinical results.    Medical records reviewed  Summary:      Office Visit on 03/07/2019   Component Date Value Ref Range Status   • Glucose 03/07/2019 157* 65 - 99 mg/dL Final   • BUN 03/07/2019 17  6 - 20 mg/dL Final   • Creatinine 03/07/2019 0.63  0.57 - 1.00 mg/dL Final   • eGFR Non African Am 03/07/2019 110  >60 mL/min/1.73 Final   • eGFR African Am 03/07/2019 133  >60 mL/min/1.73 Final   • BUN/Creatinine Ratio 03/07/2019 27.0* 7.0 - 25.0 Final   • Sodium 03/07/2019 137  136 - 145 mmol/L Final   • Potassium 03/07/2019 4.0  3.5 - 5.2 mmol/L Final   • Chloride 03/07/2019 105  98 - 107 mmol/L Final   • Total CO2 03/07/2019 22.9  22.0 - 29.0 mmol/L Final   • Calcium 03/07/2019 8.9  8.6 - 10.5 mg/dL Final   • Total Protein 03/07/2019 6.7  6.0 - 8.5 g/dL Final   • Albumin 03/07/2019 3.90  3.50 - 5.20 g/dL Final   • Globulin 03/07/2019 2.8  gm/dL Final   • A/G Ratio 03/07/2019 1.4  g/dL Final   • Total Bilirubin 03/07/2019 <0.2  0.1 - 1.2 mg/dL Final   • Alkaline Phosphatase 03/07/2019 81  39 - 117 U/L Final   • AST (SGOT) 03/07/2019 20  1 - 32 U/L Final   • ALT (SGPT) 03/07/2019 21  1 - 33 U/L Final   • Hemoglobin A1C 03/07/2019 6.90* 4.80 - 5.60 % Final    Comment: Hemoglobin A1C Ranges:  Increased Risk for Diabetes  5.7% to 6.4%  Diabetes                     >= 6.5%  Diabetic Goal                < 7.0%     • TSH 03/07/2019 1.050  0.270 - 4.200 mIU/mL Final   • 25 Hydroxy, Vitamin D 03/07/2019 81.3  30.0 - 100.0 ng/ml Final    Comment: Reference Range for Total Vitamin D 25(OH)  Deficiency <20.0 ng/mL  Insufficiency  21-29 ng/mL  Sufficiency  ng/mL  Toxicity >100 ng/ml     • Unable to Void 03/07/2019 Comment   Final    Comment: The patient was not able to render a urine sample and has been  instructed to return for a urine collection at their earliest  convenience.  The urine testing that you have requested has  been deleted from this report.  When the patient returns and  provides a urine specimen, the urine testing will be performed  and separately reported.       Lab Results   Component Value Date    HGBA1C 6.90 (H) 03/07/2019    HGBA1C 6.88 (H) 11/06/2018    HGBA1C 7.10 (H) 06/25/2018     Lab Results   Component Value Date    CREATININE 0.63 03/07/2019     Imaging Results (most recent)     None            Assessment and Plan:    Diagnoses and all orders for this visit:    Diabetes mellitus type 1, uncontrolled, with complications (CMS/[facility])  -     Comprehensive Metabolic Panel  -     Hemoglobin A1c  -     TSH    Hyperinsulinism    Other cerebral palsy (CMS/[facility])    Seizure disorder (CMS/[facility])    Encounter for long-term (current) use of insulin (CMS/[facility])    Mental retardation      Glucometer reviewed  Blood sugars ranging from 100 300  Most of the blood sugars at home are in the 100-200 range  Patient has elevated blood sugars at lunchtime when she is at the  center    Patient is currently taking Levemir 15 units twice daily  She will take NovoLog 1 unit for every 15 carbs and 1 unit for every 40/140 correction  Higher incidence of hypoglycemia is much less  Patient is currently taking 5 injections daily  She checks 5 times daily  She has not heard from the Dexcom yet    Patient will benefit significantly from using continuous glucometer  Patient will return to follow-up in 3 to 4 months    The total time spent  was more than 25 min of which greater than 15 min of time (greater than 50% of the total time)  was spent face to face with the patient counseling and coordination of care on recommended evaluation and  "treatment options, instructions for management/treatment and /or follow up and importance of compliance with chosen management or treatment options   Giovanni Lewis MD. FACE    08/09/19      EMR Dragon / transcription disclaimer:     \"Dictated utilizing Dragon dictation\".         "

## 2019-08-10 LAB
ALBUMIN SERPL-MCNC: 4 G/DL (ref 3.5–5.2)
ALBUMIN/GLOB SERPL: 1.4 G/DL
ALP SERPL-CCNC: 86 U/L (ref 39–117)
ALT SERPL-CCNC: 35 U/L (ref 1–33)
AST SERPL-CCNC: 30 U/L (ref 1–32)
BILIRUB SERPL-MCNC: 0.2 MG/DL (ref 0.2–1.2)
BUN SERPL-MCNC: 16 MG/DL (ref 6–20)
BUN/CREAT SERPL: 23.9 (ref 7–25)
CALCIUM SERPL-MCNC: 8.3 MG/DL (ref 8.6–10.5)
CHLORIDE SERPL-SCNC: 102 MMOL/L (ref 98–107)
CO2 SERPL-SCNC: 24.9 MMOL/L (ref 22–29)
CREAT SERPL-MCNC: 0.67 MG/DL (ref 0.57–1)
GLOBULIN SER CALC-MCNC: 2.9 GM/DL
GLUCOSE SERPL-MCNC: 180 MG/DL (ref 65–99)
HBA1C MFR BLD: 6.7 % (ref 4.8–5.6)
POTASSIUM SERPL-SCNC: 4.4 MMOL/L (ref 3.5–5.2)
PROT SERPL-MCNC: 6.9 G/DL (ref 6–8.5)
SODIUM SERPL-SCNC: 138 MMOL/L (ref 136–145)
TSH SERPL DL<=0.005 MIU/L-ACNC: 1.32 MIU/ML (ref 0.27–4.2)

## 2019-08-16 ENCOUNTER — TELEPHONE (OUTPATIENT)
Dept: ENDOCRINOLOGY | Age: 34
End: 2019-08-16

## 2019-08-16 NOTE — TELEPHONE ENCOUNTER
----- Message from Maite Godfrey sent at 8/14/2019  2:56 PM EDT -----  Contact: Trish - mother  Mother Trish said Dr. Triana ordered a Dexcom glucose monitor over a year ago and that you got rejections. She wants to contact Medicaid and wants to talk to you to get information first..     Pt - [phone]      SPOKE WITH PATIENT'S MOTHER AND WE DECIDED TO SEND THE PAPERWORK THROUGH [employer] ONCE MORE DUE TO HAVING NEW REPRESENTATIVES. THEY WILL BE CALLING HER WITH INFORMATION.

## 2019-08-28 DIAGNOSIS — E10.8 TYPE 1 DIABETES MELLITUS WITH COMPLICATIONS (HCC): ICD-10-CM

## 2019-08-29 RX ORDER — BLOOD SUGAR DIAGNOSTIC
STRIP MISCELLANEOUS
Qty: 100 EACH | Refills: 0 | Status: SHIPPED | OUTPATIENT
Start: 2019-08-29 | End: 2019-09-19 | Stop reason: SDUPTHER

## 2019-09-19 DIAGNOSIS — E10.8 TYPE 1 DIABETES MELLITUS WITH COMPLICATIONS (HCC): ICD-10-CM

## 2019-09-19 RX ORDER — BLOOD SUGAR DIAGNOSTIC
STRIP MISCELLANEOUS
Qty: 100 EACH | Refills: 5 | Status: SHIPPED | OUTPATIENT
Start: 2019-09-19 | End: 2020-02-10

## 2019-10-02 ENCOUNTER — TELEPHONE (OUTPATIENT)
Dept: INTERNAL MEDICINE | Facility: CLINIC | Age: 34
End: 2019-10-02

## 2019-10-02 NOTE — TELEPHONE ENCOUNTER
"I called Trish to let her know that she does need a \"nurse\" visit to get this flu shot, and she does not need to see the doctor. She said that [place] is ill. She will call back to schedule her shot    ----- Message from Katrin Cast MA sent at 10/2/2019 12:52 PM EDT -----  Contact: mom Trish  Just schedule nurse visit.    ----- Message -----  From: Shannon Faith  Sent: 10/1/2019   3:56 PM  To: Jacki Guzman [facility]    Lo pt    Mom called to try to get patient set up for a flu shot. Does she need a visit for this, or a nurse visit. Since she is handicap adult? Please call mom       "

## 2019-10-15 ENCOUNTER — TELEPHONE (OUTPATIENT)
Dept: ENDOCRINOLOGY | Age: 34
End: 2019-10-15

## 2019-10-15 NOTE — TELEPHONE ENCOUNTER
Have you received request for dexcom supplies    [place] continuous glucose questionaire    Faxed over on 10/01    Please call nancy [phone]

## 2019-10-30 ENCOUNTER — CLINICAL SUPPORT (OUTPATIENT)
Dept: INTERNAL MEDICINE | Facility: CLINIC | Age: 34
End: 2019-10-30

## 2019-10-30 DIAGNOSIS — Z23 IMMUNIZATION DUE: Primary | ICD-10-CM

## 2019-10-30 PROCEDURE — 90471 IMMUNIZATION ADMIN: CPT | Performed by: FAMILY MEDICINE

## 2019-10-30 PROCEDURE — 90686 IIV4 VACC NO PRSV 0.5 ML IM: CPT | Performed by: FAMILY MEDICINE

## 2019-11-04 ENCOUNTER — TELEPHONE (OUTPATIENT)
Dept: NEUROLOGY | Facility: CLINIC | Age: 34
End: 2019-11-04

## 2019-11-04 NOTE — TELEPHONE ENCOUNTER
If patient calls back, please inform patient that appointment with Dr Vyas was r/s from 1-21-20 @ 2677 to 1-28-20 @ 8266.

## 2019-12-09 ENCOUNTER — OFFICE VISIT (OUTPATIENT)
Dept: ENDOCRINOLOGY | Age: 34
End: 2019-12-09

## 2019-12-09 VITALS
SYSTOLIC BLOOD PRESSURE: 110 MMHG | HEART RATE: 100 BPM | WEIGHT: 96 LBS | DIASTOLIC BLOOD PRESSURE: 64 MMHG | HEIGHT: 62 IN | BODY MASS INDEX: 17.66 KG/M2

## 2019-12-09 DIAGNOSIS — G40.909 SEIZURE DISORDER (HCC): ICD-10-CM

## 2019-12-09 DIAGNOSIS — E55.9 VITAMIN D DEFICIENCY: ICD-10-CM

## 2019-12-09 DIAGNOSIS — E10.8 TYPE 1 DIABETES MELLITUS WITH COMPLICATIONS (HCC): Primary | ICD-10-CM

## 2019-12-09 DIAGNOSIS — G80.8 OTHER CEREBRAL PALSY (HCC): ICD-10-CM

## 2019-12-09 DIAGNOSIS — Z79.4 ENCOUNTER FOR LONG-TERM (CURRENT) USE OF INSULIN (HCC): ICD-10-CM

## 2019-12-09 DIAGNOSIS — E16.1 HYPERINSULINISM: ICD-10-CM

## 2019-12-09 PROCEDURE — 99214 OFFICE O/P EST MOD 30 MIN: CPT | Performed by: INTERNAL MEDICINE

## 2019-12-09 RX ORDER — MIDAZOLAM HYDROCHLORIDE 5 MG/ML
10 INJECTION INTRAMUSCULAR; INTRAVENOUS
COMMUNITY
Start: 2019-09-10

## 2019-12-09 NOTE — PROGRESS NOTES
34 y.o.    Patient Care Team:  Drew Blanchard MD as PCP - General (Family Medicine)  Angel Roque MD as PCP - Family Medicine  Giovanni Lewis MD as Consulting Physician (Endocrinology)  Mickey Vyas Jr., MD as Consulting Physician (Neurology)  Migeu Echavarria MD as Consulting Physician (Allergy)    Chief Complaint:      F/U TYPE 1 DIABETES, UNCONTROLLED.  NO RECENT LABS.     Subjective     HPI   Patient is a 34-year-old white female with a history of mental condition, cerebral palsy, seizure disorder months, since age 19 came for follow-up  Parents were caregivers    Uncontrolled type 1 diabetes mellitus  Patient is currently using Levemir 15 units twice daily and NovoLog 1 unit for every 15 carbs and correction of 1 unit for every 40/140  Mostly in the 100-300 range  Able to be much higher at the  center  Patient just received the Dexcom unit yesterday   They are requesting help with set up  Hypoglycemia  Patient had very few episodes  No history of diabetic neuropathy or retinopathy or nephropathy  Hemoglobin A1c appears to be low but fructosamine is elevated  Vitamin D deficiency  Patient is currently taking vitamin D3 daily        Interval History      The following portions of the patient's history were reviewed and updated as appropriate: allergies, current medications, past family history, past medical history, past social history, past surgical history and problem list.    Past Medical History:   Diagnosis Date   • Cerebral palsy (CMS/HCC)     birth   • Diabetes mellitus (CMS/HCC)    • Diabetes mellitus type I (CMS/HCC)    • Hives     chronic   • Hives    • Seizures (CMS/HCC)      Family History   Problem Relation Age of Onset   • Hypertension Mother    • Hyperlipidemia Mother    • Seasonal affective disorder Father      Social History     Socioeconomic History   • Marital status: Single     Spouse name: Not on file   • Number of children: Not on file   • Years of  education: Not on file   • Highest education level: Not on file   Tobacco Use   • Smoking status: Never Smoker   • Smokeless tobacco: Never Used   Substance and Sexual Activity   • Alcohol use: No   • Sexual activity: Never     No Known Allergies    Current Outpatient Medications:   •  acetaminophen (TYLENOL) 325 MG tablet, Take 1 tablet by mouth Every 6 (Six) Hours As Needed for Mild Pain ., Disp: 30 tablet, Rfl: 11  •  Cholecalciferol 2000 UNITS capsule, Take 5,000 Units by mouth., Disp: , Rfl:   •  Diapers & Supplies misc, Depends pull-ups in small/medium.  Use as needed.  Needs 2 cases every other month., Disp: 2 each, Rfl: 5  •  diphenhydrAMINE (BENADRYL) 25 MG tablet, Take 1 tablet by mouth Every 6 (Six) Hours As Needed for Itching., Disp: 30 tablet, Rfl: 11  •  FLUoxetine (PROzac) 20 MG capsule, TAKE ONE CAPSULE BY MOUTH DAILY, Disp: 30 capsule, Rfl: 10  •  glucagon (GLUCAGEN) 1 MG injection, Inject 1 mg into the appropriate muscle as directed by prescriber 1 (One) Time As Needed for Low Blood Sugar for up to 1 dose., Disp: 2 kit, Rfl: 6  •  insulin aspart (NOVOLOG FLEXPEN) 100 UNIT/ML solution pen-injector sc pen, INJECT UNDER THE SKIN 8 UNITS THREE TIMES A DAY BEFORE MEALS, Disp: 15 mL, Rfl: 9  •  Insulin Pen Needle (PEN NEEDLES) 32G X 4 MM misc, USE 5 TIMES PER DAY, Disp: 200 each, Rfl: 11  •  LEVEMIR FLEXTOUCH 100 UNIT/ML injection, INJECT UNDER THE SKIN 20 UNITS TWO TIMES A DAY, Disp: 15 mL, Rfl: 9  •  levocetirizine (XYZAL) 5 MG tablet, Take 5 mg by mouth every evening., Disp: , Rfl:   •  midazolam (VERSED) 5 MG/ML injection, 10 mg into the nostril(s) as directed by provider., Disp: , Rfl:   •  ONETOUCH VERIO test strip, CHECK GLUCOSE BEFORE LUNCH AND AS NEEDED, Disp: 200 each, Rfl: 3  •  ONETOUCH VERIO test strip, TEST BLOOD SUGAR 5 TIMES A DAY, Disp: 100 each, Rfl: 5  •  OXcarbazepine (TRILEPTAL) 600 MG tablet, Take 1.5 tablets by mouth 2 (Two) Times a Day., Disp: 90 tablet, Rfl: 11  •  SUNSCREEN  "SPF50 lotion, Apply generously every 2 hours while outside., Disp: 1 bottle, Rfl: 11  •  topiramate (TOPAMAX) 25 MG tablet, Take 1 tablet by mouth 2 (Two) Times a Day., Disp: 60 tablet, Rfl: 11  •  topiramate (TOPAMAX) 50 MG tablet, Take 1 tablet by mouth 2 (Two) Times a Day., Disp: 60 tablet, Rfl: 11        Review of Systems   Constitutional: Negative for chills, fatigue and fever.   Cardiovascular: Negative for chest pain and palpitations.   Gastrointestinal: Negative for abdominal pain, constipation, diarrhea, nausea and vomiting.   Endocrine: Negative for cold intolerance and heat intolerance.   All other systems reviewed and are negative.      Objective       Vitals:    12/09/19 1353   BP: 110/64   Pulse: 100   Weight: 43.5 kg (96 lb)   Height: 157.5 cm (62\")     Body mass index is 17.56 kg/m².      Physical Exam   Eyes: Pupils are equal, round, and reactive to light. EOM are normal.   Neck: Normal range of motion. Neck supple. No thyromegaly present.   Cardiovascular: Normal rate, regular rhythm, normal heart sounds and intact distal pulses.   Pulmonary/Chest: Effort normal and breath sounds normal.   Abdominal: Soft. Bowel sounds are normal. She exhibits no distension. There is no tenderness.   Musculoskeletal: Normal range of motion. She exhibits no edema.   Neurological: She is alert.   Skin: Skin is warm and dry.   Psychiatric: Her behavior is normal.   Nursing note and vitals reviewed.    Results Review:     I reviewed the patient's new clinical results.    Medical records reviewed  Summary:  Neurology notes reviewed  Status post VNS insertion November 2019        Office Visit on 08/09/2019   Component Date Value Ref Range Status   • Glucose 08/09/2019 180* 65 - 99 mg/dL Final   • BUN 08/09/2019 16  6 - 20 mg/dL Final   • Creatinine 08/09/2019 0.67  0.57 - 1.00 mg/dL Final   • eGFR Non African Am 08/09/2019 101  >60 mL/min/1.73 Final   • eGFR African Am 08/09/2019 123  >60 mL/min/1.73 Final   • " BUN/Creatinine Ratio 08/09/2019 23.9  7.0 - 25.0 Final   • Sodium 08/09/2019 138  136 - 145 mmol/L Final   • Potassium 08/09/2019 4.4  3.5 - 5.2 mmol/L Final   • Chloride 08/09/2019 102  98 - 107 mmol/L Final   • Total CO2 08/09/2019 24.9  22.0 - 29.0 mmol/L Final   • Calcium 08/09/2019 8.3* 8.6 - 10.5 mg/dL Final   • Total Protein 08/09/2019 6.9  6.0 - 8.5 g/dL Final   • Albumin 08/09/2019 4.00  3.50 - 5.20 g/dL Final   • Globulin 08/09/2019 2.9  gm/dL Final   • A/G Ratio 08/09/2019 1.4  g/dL Final   • Total Bilirubin 08/09/2019 0.2  0.2 - 1.2 mg/dL Final   • Alkaline Phosphatase 08/09/2019 86  39 - 117 U/L Final   • AST (SGOT) 08/09/2019 30  1 - 32 U/L Final   • ALT (SGPT) 08/09/2019 35* 1 - 33 U/L Final   • Hemoglobin A1C 08/09/2019 6.70* 4.80 - 5.60 % Final    Comment: Hemoglobin A1C Ranges:  Increased Risk for Diabetes  5.7% to 6.4%  Diabetes                     >= 6.5%  Diabetic Goal                < 7.0%     • TSH 08/09/2019 1.320  0.270 - 4.200 mIU/mL Final     Lab Results   Component Value Date    HGBA1C 6.70 (H) 08/09/2019    HGBA1C 6.90 (H) 03/07/2019    HGBA1C 6.88 (H) 11/06/2018     Lab Results   Component Value Date    CREATININE 0.67 08/09/2019     Imaging Results (Most Recent)     None                Assessment and Plan:    [place] was seen today for diabetes.    Diagnoses and all orders for this visit:    Type 1 diabetes mellitus with complications (CMS/HCC)  -     Comprehensive Metabolic Panel  -     Hemoglobin A1c  -     TSH  -     Vitamin D 25 Hydroxy  -     Microalbumin / Creatinine Urine Ratio - Urine, Clean Catch    Vitamin D deficiency  -     Comprehensive Metabolic Panel  -     Hemoglobin A1c  -     TSH  -     Vitamin D 25 Hydroxy  -     Microalbumin / Creatinine Urine Ratio - Urine, Clean Catch    Hyperinsulinism    Other cerebral palsy (CMS/[facility])    Seizure disorder (CMS/HCC)    Encounter for long-term (current) use of insulin (CMS/HCC)      Patient's family reported that she finally has  "Dexcom at home  They needed help with the set up  I had the Dexcom  said are not discussed with the patient's family and give some guidance towards setting up  They appeared comfortable  Patient will get lab testing done today  She will return to follow-up in 3 months      Giovanni Lewis MD. FACE    12/23/19      EMR Dragon / transcription disclaimer:     \"Dictated utilizing Dragon dictation\".         "

## 2020-01-23 DIAGNOSIS — E10.8 TYPE 1 DIABETES MELLITUS WITH COMPLICATIONS (HCC): ICD-10-CM

## 2020-01-23 DIAGNOSIS — IMO0002 DIABETES MELLITUS TYPE 1, UNCONTROLLED, WITH COMPLICATIONS: ICD-10-CM

## 2020-01-23 DIAGNOSIS — E16.1 HYPOGLYCEMIA DUE TO ENDOGENOUS HYPERINSULINEMIA: ICD-10-CM

## 2020-01-23 RX ORDER — BLOOD SUGAR DIAGNOSTIC
STRIP MISCELLANEOUS
Qty: 200 EACH | Refills: 3 | Status: SHIPPED | OUTPATIENT
Start: 2020-01-23 | End: 2020-07-15 | Stop reason: SDUPTHER

## 2020-02-10 DIAGNOSIS — E10.8 TYPE 1 DIABETES MELLITUS WITH COMPLICATIONS (HCC): ICD-10-CM

## 2020-02-10 RX ORDER — BLOOD SUGAR DIAGNOSTIC
STRIP MISCELLANEOUS
Qty: 500 EACH | Refills: 4 | Status: SHIPPED | OUTPATIENT
Start: 2020-02-10 | End: 2020-07-15 | Stop reason: SDUPTHER

## 2020-02-19 ENCOUNTER — TELEPHONE (OUTPATIENT)
Dept: ENDOCRINOLOGY | Age: 35
End: 2020-02-19

## 2020-02-19 NOTE — TELEPHONE ENCOUNTER
Pt mother  called stated the pharmacy has sent several request over for pt Novolog needing a PA. Mom stated the pharmacy said they have not heard anything back yet, and her daughter is running low on her insulin.  Pt mother is requesting a call back

## 2020-02-21 NOTE — TELEPHONE ENCOUNTER
Called and spoke with pharmacy due to doing the PA and it coming back as A claim submitted for a quantity of 15 for a 30 days' supply of NOVOLOG FLEXPEN 100/ML (3) INSULN PEN will deny at the pharmacy for a Therapeutic Duplication. The dispensing pharmacy can resolve the claim error if appropriate. Pharmacy will run script as 60 day supply for coverage.

## 2020-03-02 ENCOUNTER — TELEPHONE (OUTPATIENT)
Dept: INTERNAL MEDICINE | Facility: CLINIC | Age: 35
End: 2020-03-02

## 2020-03-02 NOTE — TELEPHONE ENCOUNTER
Caller is [employer] and they have been waiting on a signed plan of care. Says she has been calling about this for a few weeks now.   [phone].

## 2020-03-03 NOTE — TELEPHONE ENCOUNTER
Advised that I had faxed this last week.  PIC rep to sent another form to me directly to fill out.

## 2020-03-12 DIAGNOSIS — E10.8 TYPE 1 DIABETES MELLITUS WITH COMPLICATIONS (HCC): ICD-10-CM

## 2020-03-12 RX ORDER — INSULIN DETEMIR 100 [IU]/ML
INJECTION, SOLUTION SUBCUTANEOUS
Qty: 15 ML | Refills: 8 | Status: SHIPPED | OUTPATIENT
Start: 2020-03-12 | End: 2020-06-04 | Stop reason: CLARIF

## 2020-04-07 ENCOUNTER — TELEPHONE (OUTPATIENT)
Dept: ENDOCRINOLOGY | Age: 35
End: 2020-04-07

## 2020-04-09 DIAGNOSIS — E10.8 TYPE 1 DIABETES MELLITUS WITH COMPLICATIONS (HCC): ICD-10-CM

## 2020-04-09 RX ORDER — PEN NEEDLE, DIABETIC 30 GX3/16"
32 NEEDLE, DISPOSABLE MISCELLANEOUS
Qty: 200 EACH | Refills: 11 | Status: SHIPPED | OUTPATIENT
Start: 2020-04-09

## 2020-04-13 RX ORDER — FLUOXETINE HYDROCHLORIDE 20 MG/1
CAPSULE ORAL
Qty: 30 CAPSULE | Refills: 9 | Status: SHIPPED | OUTPATIENT
Start: 2020-04-13 | End: 2021-02-03

## 2020-04-27 RX ORDER — PROCHLORPERAZINE 25 MG/1
SUPPOSITORY RECTAL
Qty: 3 EACH | Refills: 13 | Status: SHIPPED | OUTPATIENT
Start: 2020-04-27 | End: 2020-06-10 | Stop reason: SDUPTHER

## 2020-04-27 RX ORDER — PROCHLORPERAZINE 25 MG/1
1 SUPPOSITORY RECTAL
Qty: 1 EACH | Refills: 3 | Status: SHIPPED | OUTPATIENT
Start: 2020-04-27 | End: 2020-06-10 | Stop reason: SDUPTHER

## 2020-06-03 ENCOUNTER — TELEPHONE (OUTPATIENT)
Dept: ENDOCRINOLOGY | Age: 35
End: 2020-06-03

## 2020-06-04 RX ORDER — INSULIN GLARGINE 100 [IU]/ML
20 INJECTION, SOLUTION SUBCUTANEOUS 2 TIMES DAILY
Qty: 15 ML | Refills: 3 | Status: SHIPPED | OUTPATIENT
Start: 2020-06-04

## 2020-06-10 ENCOUNTER — TELEPHONE (OUTPATIENT)
Dept: ENDOCRINOLOGY | Age: 35
End: 2020-06-10

## 2020-06-10 DIAGNOSIS — IMO0002 DIABETES MELLITUS TYPE 1, UNCONTROLLED, WITH COMPLICATIONS: Primary | ICD-10-CM

## 2020-06-10 DIAGNOSIS — E16.1 HYPERINSULINISM: ICD-10-CM

## 2020-06-10 RX ORDER — PROCHLORPERAZINE 25 MG/1
1 SUPPOSITORY RECTAL
Qty: 1 EACH | Refills: 3 | Status: SHIPPED | OUTPATIENT
Start: 2020-06-10

## 2020-06-10 RX ORDER — INSULIN LISPRO 100 [IU]/ML
INJECTION, SOLUTION INTRAVENOUS; SUBCUTANEOUS
Qty: 15 ML | Refills: 5 | Status: SHIPPED | OUTPATIENT
Start: 2020-06-10 | End: 2020-08-11 | Stop reason: SDUPTHER

## 2020-06-10 RX ORDER — PROCHLORPERAZINE 25 MG/1
SUPPOSITORY RECTAL
Qty: 3 EACH | Refills: 13 | Status: SHIPPED | OUTPATIENT
Start: 2020-06-10

## 2020-06-10 NOTE — TELEPHONE ENCOUNTER
I sent a prescription for Humalog  We cant play this  game with the insurance forever before patient runs out  Can the family find out what insulin is actually on the formulary list and let us know

## 2020-06-10 NOTE — TELEPHONE ENCOUNTER
PHARMACY STATING HER INSURANCE WILL NO LONGER COVER INSULIN ASPART PLEASE ADVISE OF ANOTHER MED FOR PT TO SEND INTO PHARMACY

## 2020-06-26 NOTE — PROGRESS NOTES
35 y.o.    Patient Care Team:  Drew Blacnhard MD as PCP - General (Family Medicine)  Angel Roque MD as PCP - Family Medicine  Giovanni Lewis MD as Consulting Physician (Endocrinology)  Mickey Vyas Jr., MD as Consulting Physician (Neurology)  Migue Echavarria MD as Consulting Physician (Allergy)    Chief Complaint:  PT HERE FOR FUP ON DIABETES TYPE 1 UNCONTROLLED MED EVAL REFILLS     Subjective     HPI   Patient is a 35-year-old white female with a history of mental retardation, cerebral palsy, seizure disorder and diabetes mellitus since age 19 came for follow-up  Parents are the caregivers  Uncontrolled type 1 diabetes mellitus  Patient is currently using Dexcom CGM G6  She is currently taking Basaglar 12 units in the morning  Apparently Basaglar in the evening caused her to have hypoglycemia and he stopped it about 2 weeks ago  She is currently taking lisinopril 1 unit for every 15 carbs and the correction of 1 unit for every 40/140  Most of the blood sugars appear to be in the 82 250 range  Hypoglycemia  Patient is occasional episodes of hypoglycemia but not frequently  Patient denies any history of neuropathy or retinopathy or nephropathy  Vitamin D deficiency  Currently taking vitamin D3 daily      Interval History      The following portions of the patient's history were reviewed and updated as appropriate: allergies, current medications, past family history, past medical history, past social history, past surgical history and problem list.    Past Medical History:   Diagnosis Date   • Cerebral palsy (CMS/HCC)     birth   • Diabetes mellitus (CMS/HCC)    • Diabetes mellitus type I (CMS/HCC)    • Hives     chronic   • Hives    • Seizures (CMS/HCC)      Family History   Problem Relation Age of Onset   • Hypertension Mother    • Hyperlipidemia Mother    • Seasonal affective disorder Father      Social History     Socioeconomic History   • Marital status: Single     Spouse name:  Not on file   • Number of children: Not on file   • Years of education: Not on file   • Highest education level: Not on file   Tobacco Use   • Smoking status: Never Smoker   • Smokeless tobacco: Never Used   Substance and Sexual Activity   • Alcohol use: No   • Sexual activity: Never     No Known Allergies    Current Outpatient Medications:   •  acetaminophen (Tylenol) 325 MG tablet, Take 1 tablet by mouth Every 6 (Six) Hours As Needed for Mild Pain ., Disp: 30 tablet, Rfl: 11  •  Cholecalciferol 2000 UNITS capsule, Take 5,000 Units by mouth., Disp: , Rfl:   •  Continuous Blood Gluc Sensor (DEXCOM G6 SENSOR), Every 10 (Ten) Days., Disp: 3 each, Rfl: 13  •  Continuous Blood Gluc Transmit (DEXCOM G6 TRANSMITTER) misc, 1 package Every 3 (Three) Months., Disp: 1 each, Rfl: 3  •  Diapers & Supplies misc, Depends pull-ups in small/medium.  Use as needed.  Needs 2 cases every other month., Disp: 2 each, Rfl: 5  •  diphenhydrAMINE (BENADRYL) 25 MG tablet, Take 1 tablet by mouth Every 6 (Six) Hours As Needed for Itching., Disp: 30 tablet, Rfl: 11  •  FLUoxetine (PROzac) 20 MG capsule, TAKE ONE CAPSULE BY MOUTH DAILY, Disp: 30 capsule, Rfl: 9  •  glucagon (GLUCAGEN) 1 MG injection, Inject 1 mg into the appropriate muscle as directed by prescriber 1 (One) Time As Needed for Low Blood Sugar for up to 1 dose., Disp: 2 kit, Rfl: 6  •  insulin aspart (NOVOLOG FLEXPEN) 100 UNIT/ML solution pen-injector sc pen, Inject 8 Units under the skin into the appropriate area as directed 3 (Three) Times a Day With Meals. Run as 60 day supply, Disp: 15 mL, Rfl: 2  •  Insulin Glargine (BASAGLAR KWIKPEN) 100 UNIT/ML injection pen, Inject 20 Units under the skin into the appropriate area as directed 2 (Two) Times a Day., Disp: 15 mL, Rfl: 3  •  Insulin Pen Needle (PEN NEEDLES) 32G X 4 MM misc, Inject 32 gauzes under the skin into the appropriate area as directed 5 (Five) Times a Day. PT TO USE WITH INSULIN PENS 5 TIMES DAILY, Disp: 200 each, Rfl:  "11  •  levocetirizine (XYZAL) 5 MG tablet, Take 5 mg by mouth every evening., Disp: , Rfl:   •  midazolam (VERSED) 5 MG/ML injection, 10 mg into the nostril(s) as directed by provider., Disp: , Rfl:   •  ONETOUCH VERIO test strip, CHECK GLUCOSE BEFORE LUNCH AND AS NEEDED, Disp: 200 each, Rfl: 3  •  ONETOUCH VERIO test strip, USE 1 TO TEST BLOOD SUGAR 5 TIMES PER DAY, Disp: 500 each, Rfl: 4  •  OXcarbazepine (TRILEPTAL) 600 MG tablet, Take 1.5 tablets by mouth 2 (Two) Times a Day., Disp: 90 tablet, Rfl: 11  •  SUNSCREEN SPF50 lotion, Apply generously every 2 hours while outside., Disp: 1 bottle, Rfl: 11  •  topiramate (TOPAMAX) 50 MG tablet, Take 1 tablet by mouth 2 (Two) Times a Day. (Patient taking differently: Take 75 mg by mouth 2 (Two) Times a Day.), Disp: 60 tablet, Rfl: 11  •  Insulin Lispro, 1 Unit Dial, (HumaLOG KwikPen) 100 UNIT/ML solution pen-injector, 8 units 3 times daily AC subcu, Disp: 15 mL, Rfl: 5        Review of Systems   Constitutional: Negative.  Negative for appetite change, fatigue and fever.   HENT: Negative.    Eyes: Negative.  Negative for visual disturbance.   Respiratory: Negative.  Negative for shortness of breath.    Cardiovascular: Negative.  Negative for palpitations and leg swelling.   Gastrointestinal: Negative.  Negative for abdominal pain and vomiting.   Endocrine: Negative.  Negative for polydipsia and polyuria.   Genitourinary: Negative.    Musculoskeletal: Negative.  Negative for joint swelling and neck pain.   Skin: Negative.  Negative for rash.   Allergic/Immunologic: Negative.    Neurological: Negative.  Negative for weakness and numbness.   Hematological: Negative.    Psychiatric/Behavioral: Negative.  Negative for behavioral problems.   All other systems reviewed and are negative.      Objective       Vitals:    06/30/20 1504   Pulse: 76   Resp: 20   SpO2: 95%   Weight: 43.1 kg (95 lb)   Height: 157.5 cm (62\")     Body mass index is 17.38 kg/m².      Physical Exam  Results " Review:     I reviewed the patient's new clinical results.    Medical records reviewed  Summary:      Office Visit on 08/09/2019   Component Date Value Ref Range Status   • Glucose 08/09/2019 180* 65 - 99 mg/dL Final   • BUN 08/09/2019 16  6 - 20 mg/dL Final   • Creatinine 08/09/2019 0.67  0.57 - 1.00 mg/dL Final   • eGFR Non African Am 08/09/2019 101  >60 mL/min/1.73 Final   • eGFR African Am 08/09/2019 123  >60 mL/min/1.73 Final   • BUN/Creatinine Ratio 08/09/2019 23.9  7.0 - 25.0 Final   • Sodium 08/09/2019 138  136 - 145 mmol/L Final   • Potassium 08/09/2019 4.4  3.5 - 5.2 mmol/L Final   • Chloride 08/09/2019 102  98 - 107 mmol/L Final   • Total CO2 08/09/2019 24.9  22.0 - 29.0 mmol/L Final   • Calcium 08/09/2019 8.3* 8.6 - 10.5 mg/dL Final   • Total Protein 08/09/2019 6.9  6.0 - 8.5 g/dL Final   • Albumin 08/09/2019 4.00  3.50 - 5.20 g/dL Final   • Globulin 08/09/2019 2.9  gm/dL Final   • A/G Ratio 08/09/2019 1.4  g/dL Final   • Total Bilirubin 08/09/2019 0.2  0.2 - 1.2 mg/dL Final   • Alkaline Phosphatase 08/09/2019 86  39 - 117 U/L Final   • AST (SGOT) 08/09/2019 30  1 - 32 U/L Final   • ALT (SGPT) 08/09/2019 35* 1 - 33 U/L Final   • Hemoglobin A1C 08/09/2019 6.70* 4.80 - 5.60 % Final    Comment: Hemoglobin A1C Ranges:  Increased Risk for Diabetes  5.7% to 6.4%  Diabetes                     >= 6.5%  Diabetic Goal                < 7.0%     • TSH 08/09/2019 1.320  0.270 - 4.200 mIU/mL Final     Lab Results   Component Value Date    HGBA1C 6.70 (H) 08/09/2019    HGBA1C 6.90 (H) 03/07/2019    HGBA1C 6.88 (H) 11/06/2018     Lab Results   Component Value Date    CREATININE 0.7 06/17/2020     Imaging Results (Most Recent)     None                Assessment and Plan:    Paradise was seen today for diabetes.    Diagnoses and all orders for this visit:    Diabetes mellitus type 1, uncontrolled, with complications (CMS/[facility])  -     Hemoglobin A1c    Hyperinsulinism    Vitamin D deficiency    Encounter for long-term  "(current) use of insulin (CMS/[facility])    Dexcom CGM download reviewed  Overnight blood sugars appear very stable  Time in target ranges only 40%  Patient has significant postmeal hyperglycemia after breakfast and lunch  She is not so bad after dinner    Family are concerned that patient may not eat as much as they would like to and then giving fast acting insulin before meal has been troublesome    I advised him to give half the dose before meal and if the patient does eat all the meal and then she can get the other half afterwards  They verbalized understanding and are willing to try  Patient will get hemoglobin A1c done today  All other labs done recently are stable  Patient will return to follow-up in 4 months      Giovanni Lewis MD. FACE    06/30/20      EMR Dragon / transcription disclaimer:     \"Dictated utilizing Dragon dictation\".         "

## 2020-06-29 ENCOUNTER — OFFICE VISIT (OUTPATIENT)
Dept: INTERNAL MEDICINE | Facility: CLINIC | Age: 35
End: 2020-06-29

## 2020-06-29 VITALS
OXYGEN SATURATION: 94 % | DIASTOLIC BLOOD PRESSURE: 60 MMHG | HEART RATE: 74 BPM | RESPIRATION RATE: 16 BRPM | SYSTOLIC BLOOD PRESSURE: 106 MMHG | WEIGHT: 95.2 LBS | TEMPERATURE: 96.9 F | BODY MASS INDEX: 17.52 KG/M2 | HEIGHT: 62 IN

## 2020-06-29 DIAGNOSIS — Z00.00 ANNUAL PHYSICAL EXAM: Primary | ICD-10-CM

## 2020-06-29 PROBLEM — Z96.89 STATUS POST VNS (VAGUS NERVE STIMULATOR) PLACEMENT: Status: ACTIVE | Noted: 2019-11-18

## 2020-06-29 PROBLEM — G40.019 LOCALIZATION-RELATED (FOCAL) (PARTIAL) IDIOPATHIC EPILEPSY AND EPILEPTIC SYNDROMES WITH SEIZURES OF LOCALIZED ONSET, INTRACTABLE, WITHOUT STATUS EPILEPTICUS: Status: ACTIVE | Noted: 2019-09-05

## 2020-06-29 PROCEDURE — 99395 PREV VISIT EST AGE 18-39: CPT | Performed by: FAMILY MEDICINE

## 2020-06-29 RX ORDER — DIPHENHYDRAMINE HCL 25 MG
25 TABLET ORAL EVERY 6 HOURS PRN
Qty: 30 TABLET | Refills: 11 | Status: SHIPPED | OUTPATIENT
Start: 2020-06-29 | End: 2020-06-29 | Stop reason: SDUPTHER

## 2020-06-29 RX ORDER — DIPHENHYDRAMINE HCL 25 MG
25 TABLET ORAL EVERY 6 HOURS PRN
Qty: 30 TABLET | Refills: 11 | Status: SHIPPED | OUTPATIENT
Start: 2020-06-29 | End: 2021-06-30 | Stop reason: SDUPTHER

## 2020-06-29 RX ORDER — TOPIRAMATE 25 MG/1
TABLET ORAL
COMMUNITY
Start: 2020-06-09 | End: 2020-06-29

## 2020-06-29 RX ORDER — ACETAMINOPHEN 325 MG/1
325 TABLET ORAL EVERY 6 HOURS PRN
Qty: 30 TABLET | Refills: 11 | Status: SHIPPED | OUTPATIENT
Start: 2020-06-29 | End: 2021-06-30 | Stop reason: SDUPTHER

## 2020-06-29 RX ORDER — ACETAMINOPHEN 325 MG/1
325 TABLET ORAL EVERY 6 HOURS PRN
Qty: 30 TABLET | Refills: 11 | Status: SHIPPED | OUTPATIENT
Start: 2020-06-29 | End: 2020-06-29 | Stop reason: SDUPTHER

## 2020-06-29 NOTE — PROGRESS NOTES
Patient Name: Paradise Catherine    SUBJECTIVE    Paradise is a 35 y.o. female presenting for Annual Exam and Med Refill (Tylenol and Benadryl; needs prescription printed as well for Day Program)      Well Adult Physical   Patient here for a comprehensive physical exam.The patient reports no problems    Do you take any herbs or supplements that were not prescribed by a doctor? no   Are you taking calcium supplements? no   Are you taking aspirin daily? no     History:  Any STD's in the past? none    Paradise Catherine 35 y.o. female who presents for an Annual Wellness Visit.  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   • Seizure disorder (CMS/HCC)   • Vitamin D deficiency   • Chronic urticaria   • Mental retardation   • Depression with anxiety   • Diabetes mellitus type 1, uncontrolled, with complications (CMS/HCC)   • Encounter for long-term (current) use of insulin (CMS/HCC)   • Hyperinsulinism   • Type 1 diabetes mellitus with complications (CMS/HCC)   • Annual physical exam   • Mental developmental delay   • Long-term use of high-risk medication   • Status post VNS (vagus nerve stimulator) placement   • Localization-related (focal) (partial) idiopathic epilepsy and epileptic syndromes with seizures of localized onset, intractable, without status epilepticus (CMS/HCC)   .  she has been doing well with new interval problems.      Health Habits:  Dental Exam. up to date  Eye Exam. up to date  Exercise: 0 times/week.  Current exercise activities include: none      The following portions of the patient's history were reviewed and updated as appropriate: allergies, current medications, past family history, past medical history, past social history, past surgical history and problem list.    Review of Systems   Constitutional: Negative.    HENT: Negative.    Eyes: Negative.    Respiratory: Negative.     Cardiovascular: Negative.    Gastrointestinal: Negative.    Endocrine: Negative.    Genitourinary: Negative.    Musculoskeletal: Negative.    Skin: Negative.    Allergic/Immunologic: Positive for environmental allergies.   Neurological: Negative.  Negative for seizures (controlled).   Hematological: Negative.    Psychiatric/Behavioral: Negative.        Patient has no known allergies.      Current Outpatient Medications:   •  acetaminophen (TYLENOL) 325 MG tablet, Take 1 tablet by mouth Every 6 (Six) Hours As Needed for Mild Pain ., Disp: 30 tablet, Rfl: 11  •  Cholecalciferol 2000 UNITS capsule, Take 5,000 Units by mouth., Disp: , Rfl:   •  Continuous Blood Gluc Sensor (DEXCOM G6 SENSOR), Every 10 (Ten) Days., Disp: 3 each, Rfl: 13  •  Continuous Blood Gluc Transmit (DEXCOM G6 TRANSMITTER) misc, 1 package Every 3 (Three) Months., Disp: 1 each, Rfl: 3  •  Diapers & Supplies misc, Depends pull-ups in small/medium.  Use as needed.  Needs 2 cases every other month., Disp: 2 each, Rfl: 5  •  diphenhydrAMINE (BENADRYL) 25 MG tablet, Take 1 tablet by mouth Every 6 (Six) Hours As Needed for Itching., Disp: 30 tablet, Rfl: 11  •  FLUoxetine (PROzac) 20 MG capsule, TAKE ONE CAPSULE BY MOUTH DAILY, Disp: 30 capsule, Rfl: 9  •  glucagon (GLUCAGEN) 1 MG injection, Inject 1 mg into the appropriate muscle as directed by prescriber 1 (One) Time As Needed for Low Blood Sugar for up to 1 dose., Disp: 2 kit, Rfl: 6  •  insulin aspart (NOVOLOG FLEXPEN) 100 UNIT/ML solution pen-injector sc pen, Inject 8 Units under the skin into the appropriate area as directed 3 (Three) Times a Day With Meals. Run as 60 day supply, Disp: 15 mL, Rfl: 2  •  Insulin Glargine (BASAGLAR KWIKPEN) 100 UNIT/ML injection pen, Inject 20 Units under the skin into the appropriate area as directed 2 (Two) Times a Day., Disp: 15 mL, Rfl: 3  •  Insulin Lispro, 1 Unit Dial, (HumaLOG KwikPen) 100 UNIT/ML solution pen-injector, 8 units 3 times daily AC subcu, Disp: 15  "mL, Rfl: 5  •  Insulin Pen Needle (PEN NEEDLES) 32G X 4 MM misc, Inject 32 gauzes under the skin into the appropriate area as directed 5 (Five) Times a Day. PT TO USE WITH INSULIN PENS 5 TIMES DAILY, Disp: 200 each, Rfl: 11  •  levocetirizine (XYZAL) 5 MG tablet, Take 5 mg by mouth every evening., Disp: , Rfl:   •  midazolam (VERSED) 5 MG/ML injection, 10 mg into the nostril(s) as directed by provider., Disp: , Rfl:   •  ONETOUCH VERIO test strip, CHECK GLUCOSE BEFORE LUNCH AND AS NEEDED, Disp: 200 each, Rfl: 3  •  ONETOUCH VERIO test strip, USE 1 TO TEST BLOOD SUGAR 5 TIMES PER DAY, Disp: 500 each, Rfl: 4  •  OXcarbazepine (TRILEPTAL) 600 MG tablet, Take 1.5 tablets by mouth 2 (Two) Times a Day., Disp: 90 tablet, Rfl: 11  •  SUNSCREEN SPF50 lotion, Apply generously every 2 hours while outside., Disp: 1 bottle, Rfl: 11  •  topiramate (TOPAMAX) 50 MG tablet, Take 1 tablet by mouth 2 (Two) Times a Day. (Patient taking differently: Take 75 mg by mouth 2 (Two) Times a Day.), Disp: 60 tablet, Rfl: 11    OBJECTIVE    /60 (BP Location: Right arm, Patient Position: Sitting)   Pulse 74   Temp 96.9 °F (36.1 °C)   Resp 16   Ht 157.5 cm (62\")   Wt 43.2 kg (95 lb 3.2 oz)   SpO2 94%   BMI 17.41 kg/m²     Physical Exam   Constitutional: She is oriented to person, place, and time. She appears well-developed and well-nourished.   HENT:   Head: Normocephalic and atraumatic.   Right Ear: External ear normal.   Left Ear: External ear normal.   Nose: Nose normal.   Mouth/Throat: Oropharynx is clear and moist.   Eyes: Pupils are equal, round, and reactive to light. Conjunctivae and EOM are normal. No scleral icterus.   Neck: Normal range of motion. Neck supple. No thyromegaly present.   Cardiovascular: Normal rate, regular rhythm, normal heart sounds and intact distal pulses. Exam reveals no gallop and no friction rub.   No murmur heard.  Pulmonary/Chest: Effort normal and breath sounds normal. No respiratory distress. She " has no wheezes. She has no rales.   Abdominal: Soft. Bowel sounds are normal. There is no hepatosplenomegaly.   Musculoskeletal: She exhibits no edema or deformity.   Lymphadenopathy:     She has no cervical adenopathy.   Neurological: She is alert and oriented to person, place, and time. She has normal reflexes. She displays normal reflexes. No cranial nerve deficit. She exhibits normal muscle tone. Coordination normal.   Skin: Skin is warm and dry. No rash noted.   Psychiatric: She has a normal mood and affect. Her behavior is normal. Judgment and thought content normal.   Nursing note and vitals reviewed.        ASSESSMENT AND PLAN  Update vaccines if indicated.    begin progressive daily aerobic exercise program, continue current medications and return for routine annual checkups    Paradise was seen today for annual exam and med refill.    Diagnoses and all orders for this visit:    Annual physical exam        [unfilled]    Return in about 1 year (around 6/29/2021).

## 2020-06-30 ENCOUNTER — OFFICE VISIT (OUTPATIENT)
Dept: ENDOCRINOLOGY | Age: 35
End: 2020-06-30

## 2020-06-30 VITALS
BODY MASS INDEX: 17.48 KG/M2 | WEIGHT: 95 LBS | HEIGHT: 62 IN | RESPIRATION RATE: 20 BRPM | HEART RATE: 76 BPM | OXYGEN SATURATION: 95 %

## 2020-06-30 DIAGNOSIS — IMO0002 DIABETES MELLITUS TYPE 1, UNCONTROLLED, WITH COMPLICATIONS: Primary | ICD-10-CM

## 2020-06-30 DIAGNOSIS — Z79.4 ENCOUNTER FOR LONG-TERM (CURRENT) USE OF INSULIN (HCC): ICD-10-CM

## 2020-06-30 DIAGNOSIS — E55.9 VITAMIN D DEFICIENCY: ICD-10-CM

## 2020-06-30 DIAGNOSIS — E16.1 HYPERINSULINISM: ICD-10-CM

## 2020-06-30 PROCEDURE — 99214 OFFICE O/P EST MOD 30 MIN: CPT | Performed by: INTERNAL MEDICINE

## 2020-06-30 PROCEDURE — 95251 CONT GLUC MNTR ANALYSIS I&R: CPT | Performed by: INTERNAL MEDICINE

## 2020-07-01 LAB — HBA1C MFR BLD: 7.4 % (ref 4.8–5.6)

## 2020-07-15 DIAGNOSIS — E16.1 HYPOGLYCEMIA DUE TO ENDOGENOUS HYPERINSULINEMIA: ICD-10-CM

## 2020-07-15 DIAGNOSIS — IMO0002 DIABETES MELLITUS TYPE 1, UNCONTROLLED, WITH COMPLICATIONS: ICD-10-CM

## 2020-07-15 DIAGNOSIS — E10.8 TYPE 1 DIABETES MELLITUS WITH COMPLICATIONS (HCC): ICD-10-CM

## 2020-07-15 RX ORDER — BLOOD SUGAR DIAGNOSTIC
STRIP MISCELLANEOUS
Qty: 200 EACH | Refills: 3 | Status: SHIPPED | OUTPATIENT
Start: 2020-07-15 | End: 2020-07-15

## 2020-07-15 RX ORDER — BLOOD SUGAR DIAGNOSTIC
STRIP MISCELLANEOUS
Qty: 200 EACH | Refills: 3 | Status: SHIPPED | OUTPATIENT
Start: 2020-07-15 | End: 2020-08-13 | Stop reason: SDUPTHER

## 2020-08-11 DIAGNOSIS — E16.1 HYPERINSULINISM: Primary | ICD-10-CM

## 2020-08-11 DIAGNOSIS — IMO0002 DIABETES MELLITUS TYPE 1, UNCONTROLLED, WITH COMPLICATIONS: ICD-10-CM

## 2020-08-11 RX ORDER — INSULIN LISPRO 100 [IU]/ML
INJECTION, SOLUTION INTRAVENOUS; SUBCUTANEOUS
Qty: 15 ML | Refills: 5 | Status: SHIPPED | OUTPATIENT
Start: 2020-08-11 | End: 2021-06-30

## 2020-08-13 DIAGNOSIS — IMO0002 DIABETES MELLITUS TYPE 1, UNCONTROLLED, WITH COMPLICATIONS: ICD-10-CM

## 2020-08-13 DIAGNOSIS — E16.1 HYPOGLYCEMIA DUE TO ENDOGENOUS HYPERINSULINEMIA: ICD-10-CM

## 2020-08-13 DIAGNOSIS — E10.8 TYPE 1 DIABETES MELLITUS WITH COMPLICATIONS (HCC): ICD-10-CM

## 2020-08-13 RX ORDER — BLOOD SUGAR DIAGNOSTIC
STRIP MISCELLANEOUS
Qty: 200 EACH | Refills: 3 | Status: SHIPPED | OUTPATIENT
Start: 2020-08-13

## 2020-10-08 ENCOUNTER — TELEPHONE (OUTPATIENT)
Dept: INTERNAL MEDICINE | Facility: CLINIC | Age: 35
End: 2020-10-08

## 2020-10-13 ENCOUNTER — CLINICAL SUPPORT (OUTPATIENT)
Dept: INTERNAL MEDICINE | Facility: CLINIC | Age: 35
End: 2020-10-13

## 2020-10-13 DIAGNOSIS — Z23 NEED FOR INFLUENZA VACCINATION: Primary | ICD-10-CM

## 2020-10-13 PROCEDURE — 90471 IMMUNIZATION ADMIN: CPT | Performed by: FAMILY MEDICINE

## 2020-10-13 PROCEDURE — 90686 IIV4 VACC NO PRSV 0.5 ML IM: CPT | Performed by: FAMILY MEDICINE

## 2021-02-04 RX ORDER — FLUOXETINE HYDROCHLORIDE 20 MG/1
CAPSULE ORAL
Qty: 90 CAPSULE | Refills: 1 | Status: SHIPPED | OUTPATIENT
Start: 2021-02-04 | End: 2021-06-30 | Stop reason: SDUPTHER

## 2021-04-19 ENCOUNTER — TELEPHONE (OUTPATIENT)
Dept: ENDOCRINOLOGY | Age: 36
End: 2021-04-19

## 2021-06-30 ENCOUNTER — OFFICE VISIT (OUTPATIENT)
Dept: INTERNAL MEDICINE | Facility: CLINIC | Age: 36
End: 2021-06-30

## 2021-06-30 VITALS
HEIGHT: 62 IN | WEIGHT: 96 LBS | HEART RATE: 93 BPM | RESPIRATION RATE: 20 BRPM | TEMPERATURE: 96.8 F | SYSTOLIC BLOOD PRESSURE: 120 MMHG | OXYGEN SATURATION: 94 % | DIASTOLIC BLOOD PRESSURE: 76 MMHG | BODY MASS INDEX: 17.66 KG/M2

## 2021-06-30 DIAGNOSIS — IMO0002 DIABETES MELLITUS TYPE 1, UNCONTROLLED, WITH COMPLICATIONS: ICD-10-CM

## 2021-06-30 DIAGNOSIS — F41.8 DEPRESSION WITH ANXIETY: ICD-10-CM

## 2021-06-30 DIAGNOSIS — G40.909 SEIZURE DISORDER (HCC): ICD-10-CM

## 2021-06-30 DIAGNOSIS — Z00.00 ANNUAL PHYSICAL EXAM: Primary | ICD-10-CM

## 2021-06-30 DIAGNOSIS — G80.8 OTHER CEREBRAL PALSY (HCC): ICD-10-CM

## 2021-06-30 DIAGNOSIS — F81.9 MENTAL DEVELOPMENTAL DELAY: ICD-10-CM

## 2021-06-30 PROCEDURE — 99395 PREV VISIT EST AGE 18-39: CPT | Performed by: FAMILY MEDICINE

## 2021-06-30 RX ORDER — ACETAMINOPHEN 325 MG/1
325 TABLET ORAL EVERY 6 HOURS PRN
Qty: 30 TABLET | Refills: 11 | Status: SHIPPED | OUTPATIENT
Start: 2021-06-30 | End: 2021-06-30 | Stop reason: SDUPTHER

## 2021-06-30 RX ORDER — INSULIN ASPART INJECTION 100 [IU]/ML
INJECTION, SOLUTION SUBCUTANEOUS
COMMUNITY
Start: 2021-05-03

## 2021-06-30 RX ORDER — DIPHENHYDRAMINE HCL 25 MG
25 TABLET ORAL EVERY 6 HOURS PRN
Qty: 30 TABLET | Refills: 11 | Status: SHIPPED | OUTPATIENT
Start: 2021-06-30 | End: 2022-07-18 | Stop reason: SDUPTHER

## 2021-06-30 RX ORDER — ACETAMINOPHEN 325 MG/1
325 TABLET ORAL EVERY 6 HOURS PRN
Qty: 30 TABLET | Refills: 11 | Status: SHIPPED | OUTPATIENT
Start: 2021-06-30 | End: 2022-07-18 | Stop reason: SDUPTHER

## 2021-06-30 RX ORDER — DIPHENHYDRAMINE HCL 25 MG
25 TABLET ORAL EVERY 6 HOURS PRN
Qty: 30 TABLET | Refills: 11 | Status: SHIPPED | OUTPATIENT
Start: 2021-06-30 | End: 2021-06-30 | Stop reason: SDUPTHER

## 2021-06-30 RX ORDER — INSULIN DEGLUDEC INJECTION 100 U/ML
INJECTION, SOLUTION SUBCUTANEOUS
COMMUNITY
Start: 2021-06-15

## 2021-06-30 RX ORDER — FLUOXETINE 10 MG/1
20 CAPSULE ORAL DAILY
Qty: 30 CAPSULE | Refills: 12 | Status: SHIPPED | OUTPATIENT
Start: 2021-06-30 | End: 2021-08-12 | Stop reason: SDUPTHER

## 2021-06-30 RX ORDER — DIAZEPAM 20 MG/4ML
GEL RECTAL
COMMUNITY
Start: 2021-06-10

## 2021-06-30 NOTE — PROGRESS NOTES
Patient Name: Paradise Catherine    SUBJECTIVE    Paradise is a 36 y.o. female presenting for Annual Exam      Well Adult Physical   Patient here for a comprehensive physical exam.The patient reports no problems    Do you take any herbs or supplements that were not prescribed by a doctor? no   Are you taking calcium supplements? no   Are you taking aspirin daily? no     History:  Any STD's in the past? none    Paradise Catherine 36 y.o. female who presents for an Annual Wellness Visit.  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   • Seizure disorder (CMS/HCC)   • Vitamin D deficiency   • Chronic urticaria   • Mental retardation   • Depression with anxiety   • Diabetes mellitus type 1, uncontrolled, with complications (CMS/HCC)   • Encounter for long-term (current) use of insulin (CMS/HCC)   • Hyperinsulinism   • Type 1 diabetes mellitus with complications (CMS/HCC)   • Annual physical exam   • Mental developmental delay   • Long-term use of high-risk medication   • Status post VNS (vagus nerve stimulator) placement   • Localization-related (focal) (partial) idiopathic epilepsy and epileptic syndromes with seizures of localized onset, intractable, without status epilepticus (CMS/HCC)   .  she has been doing well with new interval problems.      Health Habits:  Dental Exam. up to date  Eye Exam. up to date  Exercise: 0 times/week.  Current exercise activities include: none      The following portions of the patient's history were reviewed and updated as appropriate: allergies, current medications, past family history, past medical history, past social history, past surgical history and problem list.    Review of Systems   Constitutional: Negative.    HENT: Negative.    Eyes: Negative.    Respiratory: Negative.    Cardiovascular: Negative.    Gastrointestinal: Negative.    Endocrine: Negative.     Genitourinary: Negative.    Musculoskeletal: Negative.    Skin: Negative.    Allergic/Immunologic: Positive for environmental allergies.   Neurological: Negative.  Negative for seizures (controlled).   Hematological: Negative.    Psychiatric/Behavioral: Negative.        Patient has no known allergies.      Current Outpatient Medications:   •  acetaminophen (Tylenol) 325 MG tablet, Take 1 tablet by mouth Every 6 (Six) Hours As Needed for Mild Pain ., Disp: 30 tablet, Rfl: 11  •  Continuous Blood Gluc Sensor (DEXCOM G6 SENSOR), Every 10 (Ten) Days., Disp: 3 each, Rfl: 13  •  Continuous Blood Gluc Transmit (DEXCOM G6 TRANSMITTER) misc, 1 package Every 3 (Three) Months., Disp: 1 each, Rfl: 3  •  Diapers & Supplies misc, Depends pull-ups in small/medium.  Use as needed.  Needs 2 cases every other month., Disp: 2 each, Rfl: 5  •  diazePAM (DIASTAT ACUDIAL) 20 MG rectal kit, PLACE 20 MG RECTALLY AS NEEDED (SEZIURE LASTING GREATER THAN 5 MINUTES OR MULTIPLE SEIZURES) MAX DAILY AMOUNT: 20MG, Disp: , Rfl:   •  diphenhydrAMINE (BENADRYL) 25 MG tablet, Take 1 tablet by mouth Every 6 (Six) Hours As Needed for Itching., Disp: 30 tablet, Rfl: 11  •  FLUoxetine (PROzac) 10 MG capsule, Take 2 capsules by mouth Daily., Disp: 30 capsule, Rfl: 12  •  Insulin Aspart, w/Niacinamide, (Fiasp PenFill) 100 UNIT/ML solution cartridge, Up to 25 units daily as directed., Disp: , Rfl:   •  Insulin Glargine (BASAGLAR KWIKPEN) 100 UNIT/ML injection pen, Inject 20 Units under the skin into the appropriate area as directed 2 (Two) Times a Day., Disp: 15 mL, Rfl: 3  •  Insulin Pen Needle (PEN NEEDLES) 32G X 4 MM misc, Inject 32 gauzes under the skin into the appropriate area as directed 5 (Five) Times a Day. PT TO USE WITH INSULIN PENS 5 TIMES DAILY, Disp: 200 each, Rfl: 11  •  levocetirizine (XYZAL) 5 MG tablet, Take 5 mg by mouth every evening., Disp: , Rfl:   •  midazolam (VERSED) 5 MG/ML injection, 10 mg into the nostril(s) as directed by  "provider., Disp: , Rfl:   •  ONETOUCH VERIO test strip, Pt checks bs 4 times daily, Disp: 200 each, Rfl: 3  •  OXcarbazepine (TRILEPTAL) 600 MG tablet, Take 1.5 tablets by mouth 2 (Two) Times a Day., Disp: 90 tablet, Rfl: 11  •  SUNSCREEN SPF50 lotion, Apply generously every 2 hours while outside., Disp: 1 bottle, Rfl: 11  •  topiramate (TOPAMAX) 50 MG tablet, Take 1 tablet by mouth 2 (Two) Times a Day. (Patient taking differently: Take 75 mg by mouth 2 (Two) Times a Day.), Disp: 60 tablet, Rfl: 11  •  Tresiba FlexTouch 100 UNIT/ML solution pen-injector injection, , Disp: , Rfl:   •  Cholecalciferol 2000 UNITS capsule, Take 5,000 Units by mouth., Disp: , Rfl:     OBJECTIVE    /76   Pulse 93   Temp 96.8 °F (36 °C) (Temporal)   Resp 20   Ht 157.5 cm (62.01\")   Wt 43.5 kg (96 lb)   LMP 06/14/2021   SpO2 94%   BMI 17.55 kg/m²     Physical Exam   Constitutional: She is oriented to person, place, and time. She appears well-developed.   HENT:   Head: Normocephalic and atraumatic.   Right Ear: External ear normal.   Left Ear: External ear normal.   Nose: Nose normal.   Eyes: Pupils are equal, round, and reactive to light. Conjunctivae are normal. No scleral icterus.   Neck: No thyromegaly present.   Cardiovascular: Normal rate, regular rhythm and normal heart sounds. Exam reveals no gallop and no friction rub.   No murmur heard.  Pulmonary/Chest: Effort normal and breath sounds normal. No respiratory distress. She has no wheezes. She has no rales.   Abdominal: Soft. Bowel sounds are normal.   Musculoskeletal: No deformity.   Lymphadenopathy:     She has no cervical adenopathy.   Neurological: She is alert and oriented to person, place, and time. She has normal reflexes. She displays normal reflexes. No cranial nerve deficit. She exhibits normal muscle tone. Coordination normal.   Skin: Skin is warm and dry. No rash noted.   Psychiatric: Her behavior is normal. Judgment and thought content normal.   Nursing " note and vitals reviewed.        ASSESSMENT AND PLAN  Update vaccines if indicated.    begin progressive daily aerobic exercise program, continue current medications and return for routine annual checkups    Diagnoses and all orders for this visit:    1. Annual physical exam (Primary)    2. Other cerebral palsy (CMS/HCC)    3. Seizure disorder (CMS/HCC)    4. Mental developmental delay    5. Diabetes mellitus type 1, uncontrolled, with complications (CMS/HCC)    6. Depression with anxiety  -     FLUoxetine (PROzac) 10 MG capsule; Take 2 capsules by mouth Daily.  Dispense: 30 capsule; Refill: 12    Other orders  -     Discontinue: diphenhydrAMINE (BENADRYL) 25 MG tablet; Take 1 tablet by mouth Every 6 (Six) Hours As Needed for Itching.  Dispense: 30 tablet; Refill: 11  -     Discontinue: acetaminophen (Tylenol) 325 MG tablet; Take 1 tablet by mouth Every 6 (Six) Hours As Needed for Mild Pain .  Dispense: 30 tablet; Refill: 11  -     acetaminophen (Tylenol) 325 MG tablet; Take 1 tablet by mouth Every 6 (Six) Hours As Needed for Mild Pain .  Dispense: 30 tablet; Refill: 11  -     diphenhydrAMINE (BENADRYL) 25 MG tablet; Take 1 tablet by mouth Every 6 (Six) Hours As Needed for Itching.  Dispense: 30 tablet; Refill: 11      Covid-19 vaccines done.  [facility]  Eye exam UTD, Dr. Robledo 1/2021--next 1/2023.    She goes to [facility].    She sees Dr. You for seizure disorder and Dr. Gerardo for DMI.    Parents would like to try decreasing fluoxetine dose from 20 mg to 10 mg daily and possibly wean off altogether.  Her depression has been well controlled for years.    [unfilled]    Return in about 1 year (around 6/30/2022).

## 2021-08-12 DIAGNOSIS — F41.8 DEPRESSION WITH ANXIETY: ICD-10-CM

## 2021-08-12 NOTE — TELEPHONE ENCOUNTER
Caller: Paradise Catherine    Relationship: Self    Best call back number: [phone]     Medication needed:   Requested Prescriptions     Pending Prescriptions Disp Refills   • FLUoxetine (PROzac) 10 MG capsule 30 capsule 12     Sig: Take 2 capsules by mouth Daily.       When do you need the refill by: ASAP     What additional details did the patient provide when requesting the medication:     PATIENT RUNS OUT THIS Saturday 08/14/21    Does the patient have less than a 3 day supply:  [x] Yes  [] No    What is the patient's preferred pharmacy: [facility] [address] [place] [address] [address] [facility] - [phone]  - [phone]

## 2021-08-13 RX ORDER — FLUOXETINE 10 MG/1
20 CAPSULE ORAL DAILY
Qty: 30 CAPSULE | Refills: 12 | Status: SHIPPED | OUTPATIENT
Start: 2021-08-13 | End: 2022-09-14 | Stop reason: SDUPTHER

## 2022-06-21 ENCOUNTER — TELEPHONE (OUTPATIENT)
Dept: INTERNAL MEDICINE | Facility: CLINIC | Age: 37
End: 2022-06-21

## 2022-06-21 NOTE — TELEPHONE ENCOUNTER
Caller: JOANNA    Relationship: Other    Best call back number: [phone]    What is the best time to reach you: ANYTIME    Who are you requesting to speak with (clinical staff, provider,  specific staff member):CLINICAL STAFF    Do you know the name of the person who called:JOANNA    What was the call regarding: JOANNA WITH [employer] WOULD LIKE TO KNOW IF YOU RECEIVED A PLAN OF CARE DOCUMENT THAT WAS FAXED ON 6/14/22 AND 6/21/22. AND HAS A FEW QUESTIONS. PLEASE CALL HER BACK AT NUMBER PROVIDED.     Do you require a callback: YES

## 2022-06-22 ENCOUNTER — TELEPHONE (OUTPATIENT)
Dept: INTERNAL MEDICINE | Facility: CLINIC | Age: 37
End: 2022-06-22

## 2022-06-22 NOTE — TELEPHONE ENCOUNTER
Caller: Trish Catherine    Relationship to patient: Mother    Best call back number: [phone]    Chief complaint: PHYSICAL    Type of visit: PHYSICAL    Requested date: ANYTIME AFTER 6/30    If rescheduling, when is the original appointment: N/A    Additional notes: PLEASE CALL AND ADVISE.

## 2022-06-29 NOTE — TELEPHONE ENCOUNTER
Ok for HUB to read    Called to schedule patient for physical.  approved scheduling over a new patient slot to schedule this patient for a physical on 7/11/2022.     If patients mother calls back please schedule patients physical in a new patient slot on 7/11/2022 or any available new patient slot according to patients availability.

## 2022-07-18 ENCOUNTER — OFFICE VISIT (OUTPATIENT)
Dept: INTERNAL MEDICINE | Facility: CLINIC | Age: 37
End: 2022-07-18

## 2022-07-18 VITALS
WEIGHT: 99.6 LBS | HEIGHT: 62 IN | DIASTOLIC BLOOD PRESSURE: 76 MMHG | BODY MASS INDEX: 18.33 KG/M2 | SYSTOLIC BLOOD PRESSURE: 118 MMHG | HEART RATE: 86 BPM | TEMPERATURE: 97.8 F | OXYGEN SATURATION: 98 %

## 2022-07-18 DIAGNOSIS — E10.8 TYPE 1 DIABETES MELLITUS WITH COMPLICATIONS: ICD-10-CM

## 2022-07-18 DIAGNOSIS — G80.8 OTHER CEREBRAL PALSY: ICD-10-CM

## 2022-07-18 DIAGNOSIS — F41.8 DEPRESSION WITH ANXIETY: ICD-10-CM

## 2022-07-18 DIAGNOSIS — F81.9 MENTAL DEVELOPMENTAL DELAY: ICD-10-CM

## 2022-07-18 DIAGNOSIS — Z00.00 MEDICARE ANNUAL WELLNESS VISIT, SUBSEQUENT: Primary | ICD-10-CM

## 2022-07-18 DIAGNOSIS — G40.909 SEIZURE DISORDER: ICD-10-CM

## 2022-07-18 PROCEDURE — 90471 IMMUNIZATION ADMIN: CPT | Performed by: FAMILY MEDICINE

## 2022-07-18 PROCEDURE — 1160F RVW MEDS BY RX/DR IN RCRD: CPT | Performed by: FAMILY MEDICINE

## 2022-07-18 PROCEDURE — 90715 TDAP VACCINE 7 YRS/> IM: CPT | Performed by: FAMILY MEDICINE

## 2022-07-18 PROCEDURE — 1170F FXNL STATUS ASSESSED: CPT | Performed by: FAMILY MEDICINE

## 2022-07-18 PROCEDURE — G0439 PPPS, SUBSEQ VISIT: HCPCS | Performed by: FAMILY MEDICINE

## 2022-07-18 PROCEDURE — 99213 OFFICE O/P EST LOW 20 MIN: CPT | Performed by: FAMILY MEDICINE

## 2022-07-18 RX ORDER — ACETAMINOPHEN 325 MG/1
325 TABLET ORAL EVERY 6 HOURS PRN
Qty: 30 TABLET | Refills: 11 | Status: SHIPPED | OUTPATIENT
Start: 2022-07-18

## 2022-07-18 RX ORDER — DIPHENHYDRAMINE HCL 25 MG
25 TABLET ORAL EVERY 6 HOURS PRN
Qty: 30 TABLET | Refills: 11 | Status: SHIPPED | OUTPATIENT
Start: 2022-07-18

## 2022-07-18 NOTE — PROGRESS NOTES
The ABCs of the Annual Wellness Visit  Subsequent Medicare Wellness Visit    Chief Complaint   Patient presents with   • Annual Exam      Subjective    History of Present Illness:  Paradise Catherine is a 37 y.o. female who presents for a Subsequent Medicare Wellness Visit.    The following portions of the patient's history were reviewed and   updated as appropriate: allergies, current medications, past family history, past medical history, past social history, past surgical history and problem list.    Compared to one year ago, the patient feels her physical   health is the same.    Compared to one year ago, the patient feels her mental   health is the same.    Recent Hospitalizations:  She was not admitted to the hospital during the last year.       Current Medical Providers:  Patient Care Team:  Drew Blanchard MD as PCP - General (Family Medicine)  Angel Roque MD as PCP - Family Medicine  Giovanni Lewis MD as Consulting Physician (Endocrinology)  Mickey Vyas Jr., MD as Consulting Physician (Neurology)  Migue Echavarria MD as Consulting Physician (Allergy)    Outpatient Medications Prior to Visit   Medication Sig Dispense Refill   • Cholecalciferol 2000 UNITS capsule Take 5,000 Units by mouth.     • Continuous Blood Gluc Sensor (DEXCOM G6 SENSOR) Every 10 (Ten) Days. 3 each 13   • Continuous Blood Gluc Transmit (DEXCOM G6 TRANSMITTER) misc 1 package Every 3 (Three) Months. 1 each 3   • Diapers & Supplies misc Depends pull-ups in small/medium.  Use as needed.  Needs 2 cases every other month. 2 each 5   • FLUoxetine (PROzac) 10 MG capsule Take 2 capsules by mouth Daily. 30 capsule 12   • Insulin Aspart, w/Niacinamide, (Fiasp PenFill) 100 UNIT/ML solution cartridge Up to 25 units daily as directed.     • Insulin Glargine (BASAGLAR KWIKPEN) 100 UNIT/ML injection pen Inject 20 Units under the skin into the appropriate area as directed 2 (Two) Times a Day. 15 mL 3   • Insulin Pen Needle  (PEN NEEDLES) 32G X 4 MM misc Inject 32 gauzes under the skin into the appropriate area as directed 5 (Five) Times a Day. PT TO USE WITH INSULIN PENS 5 TIMES DAILY 200 each 11   • levocetirizine (XYZAL) 5 MG tablet Take 5 mg by mouth every evening.     • midazolam (VERSED) 5 MG/ML injection 10 mg into the nostril(s) as directed by provider.     • ONETOUCH VERIO test strip Pt checks bs 4 times daily 200 each 3   • OXcarbazepine (TRILEPTAL) 600 MG tablet Take 1.5 tablets by mouth 2 (Two) Times a Day. 90 tablet 11   • SUNSCREEN SPF50 lotion Apply generously every 2 hours while outside. 1 bottle 11   • topiramate (TOPAMAX) 50 MG tablet Take 1 tablet by mouth 2 (Two) Times a Day. (Patient taking differently: Take 75 mg by mouth 2 (Two) Times a Day.) 60 tablet 11   • Tresiba FlexTouch 100 UNIT/ML solution pen-injector injection      • diazePAM (DIASTAT ACUDIAL) 20 MG rectal kit PLACE 20 MG RECTALLY AS NEEDED (SEZIURE LASTING GREATER THAN 5 MINUTES OR MULTIPLE SEIZURES) MAX DAILY AMOUNT: 20MG     • acetaminophen (Tylenol) 325 MG tablet Take 1 tablet by mouth Every 6 (Six) Hours As Needed for Mild Pain . 30 tablet 11   • diphenhydrAMINE (BENADRYL) 25 MG tablet Take 1 tablet by mouth Every 6 (Six) Hours As Needed for Itching. 30 tablet 11     No facility-administered medications prior to visit.       No opioid medication identified on active medication list. I have reviewed chart for other potential  high risk medication/s and harmful drug interactions in the elderly.          Aspirin is not on active medication list.  Aspirin use is not indicated based on review of current medical condition/s. Risk of harm outweighs potential benefits.  .    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  ACP discussion was held with the patient during this visit. Patient does not have an advance directive, information provided.          Objective    Vitals:    07/18/22 0944   BP: 118/76   BP Location: Left arm   Patient Position: Sitting   Pulse: 86   Temp: 97.8 °F (36.6 °C)   SpO2: 98%   Weight: 45.2 kg (99 lb 9.6 oz)   Height: 157.5 cm (62.01\")     Estimated body mass index is 18.21 kg/m² as calculated from the following:    Height as of this encounter: 157.5 cm (62.01\").    Weight as of this encounter: 45.2 kg (99 lb 9.6 oz).    BMI is below normal parameters (malnutrition). Recommendations: none (medical contraindication)      Does the patient have evidence of cognitive impairment? Yes    Physical Exam            HEALTH RISK ASSESSMENT    Smoking Status:  Social History     Tobacco Use   Smoking Status Never Smoker   Smokeless Tobacco Never Used     Alcohol Consumption:  Social History     Substance and Sexual Activity   Alcohol Use No     Fall Risk Screen:    MAXIMADI Fall Risk Assessment has not been completed.    Depression Screening:  PHQ-2/PHQ-9 Depression Screening 7/18/2022   Retired PHQ-9 Total Score -   Retired Total Score -   Little Interest or Pleasure in Doing Things 0-->not at all   Feeling Down, Depressed or Hopeless 0-->not at all   PHQ-9: Brief Depression Severity Measure Score 0       Health Habits and Functional and Cognitive Screening:  No " flowsheet data found.    Age-appropriate Screening Schedule:  Refer to the list below for future screening recommendations based on patient's age, sex and/or medical conditions. Orders for these recommended tests are listed in the plan section. The patient has been provided with a written plan.    Health Maintenance   Topic Date Due   • URINE MICROALBUMIN  Never done   • DIABETIC FOOT EXAM  Never done   • PAP SMEAR  Never done   • DIABETIC EYE EXAM  Never done   • INFLUENZA VACCINE  10/01/2022   • HEMOGLOBIN A1C  12/01/2022   • TDAP/TD VACCINES (2 - Td or Tdap) 07/18/2032              Assessment & Plan   CMS Preventative Services Quick Reference  Risk Factors Identified During Encounter  Immunizations Discussed/Encouraged (specific Immunizations; Tdap; Covid-19 booster at health department.  The above risks/problems have been discussed with the patient.  Follow up actions/plans if indicated are seen below in the Assessment/Plan Section.  Pertinent information has been shared with the patient in the After Visit Summary.    Diagnoses and all orders for this visit:    1. Medicare annual wellness visit, subsequent (Primary)    2. Other cerebral palsy (HCC)    3. Seizure disorder (HCC)    4. Mental developmental delay    5. 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 Depression with anxiety    Other orders  -     diphenhydrAMINE (BENADRYL) 25 MG tablet; Take 1 tablet by mouth Every 6 (Six) Hours As Needed for Itching.  Dispense: 30 tablet; Refill: 11  -     acetaminophen (Tylenol) 325 MG tablet; Take 1 tablet by mouth Every 6 (Six) Hours As Needed for Mild Pain .  Dispense: 30 tablet; Refill: 11    Continue fluoxetine 10 mg daily.    DMI, doing well per Dr. Gerardo, endocrinologist  Seizure disorder controlled per Dr. You.    Still going to [facility] [facility] and loving it.      Last labs reviewed.    Follow Up:   No follow-ups on file.     An After Visit Summary and PPPS were made available to the  patient.

## 2022-08-24 ENCOUNTER — TELEPHONE (OUTPATIENT)
Dept: INTERNAL MEDICINE | Facility: CLINIC | Age: 37
End: 2022-08-24

## 2022-08-24 NOTE — TELEPHONE ENCOUNTER
Caller: [employer]    Relationship: Other    Best call back number: [phone]    What was the call regarding: PAPERWORK WITH OT [facility]ER[facility]Y [facility]ARE WAS FAXED ON 8/10 AND 8/19, PLEASE ADVISE IF THIS WAS RECEIVED. PAPERWORK NEEDS TO BE SIGNED, DATED AND FAXED BACK.     IF IT WAS NOT RECEIVED PLEASE CALL TO ASK THEM TO RESEND.     Do you require a callback: YES

## 2022-09-14 DIAGNOSIS — F41.8 DEPRESSION WITH ANXIETY: ICD-10-CM

## 2022-09-14 NOTE — TELEPHONE ENCOUNTER
Rx Refill Note  Requested Prescriptions     Pending Prescriptions Disp Refills   • FLUoxetine (PROzac) 10 MG capsule 30 capsule 12     Sig: Take 2 capsules by mouth Daily.      Last office visit with prescribing clinician: 7/18/2022      Next office visit with prescribing clinician: Visit date not found            ASHLEIGH FLANNERY MA  09/14/22, 14:24 EDT

## 2022-09-15 RX ORDER — FLUOXETINE 10 MG/1
20 CAPSULE ORAL DAILY
Qty: 30 CAPSULE | Refills: 12 | Status: SHIPPED | OUTPATIENT
Start: 2022-09-15

## 2023-02-28 ENCOUNTER — TELEPHONE (OUTPATIENT)
Dept: INTERNAL MEDICINE | Facility: CLINIC | Age: 38
End: 2023-02-28
Payer: MEDICARE

## 2023-02-28 NOTE — TELEPHONE ENCOUNTER
JOANNA CALLED THEY FAXED OVER A PLAN OF CARE FOR SPEECH THERAPY ON 2/14.  SHE WILL FAX IT AGAIN WHEN WE GET IT BACK WE NEED TO FAX [phone].

## 2023-06-15 ENCOUNTER — TELEPHONE (OUTPATIENT)
Dept: INTERNAL MEDICINE | Facility: CLINIC | Age: 38
End: 2023-06-15
Payer: MEDICARE

## 2023-06-15 NOTE — TELEPHONE ENCOUNTER
She needs to know if we sent the speech therapy forms in.  Can we give her a call once we find something out please.  It was the mother [phone]

## 2023-09-06 ENCOUNTER — TELEPHONE (OUTPATIENT)
Dept: INTERNAL MEDICINE | Facility: CLINIC | Age: 38
End: 2023-09-06
Payer: MEDICARE

## 2023-09-06 NOTE — TELEPHONE ENCOUNTER
Ailin from [employer] Needs the Aug plan of care dated and faxed back over please. We got it signed but not dated.

## 2023-09-07 ENCOUNTER — TELEPHONE (OUTPATIENT)
Dept: INTERNAL MEDICINE | Facility: CLINIC | Age: 38
End: 2023-09-07

## 2023-09-07 NOTE — TELEPHONE ENCOUNTER
Caller: [employer]    Relationship: [employer]    Best call back number: [phone]    Do you know the name of the person who called: JOANNA    What was the call regarding: CALLER STATED THAT THEY RECEIVED A SPEECH PLAN OF CARE ON 08/31/23 AND IT WAS NOT DATED AND ASKED THAT IT BE RESENT WITH A DATE    FAX: [phone]

## 2023-11-21 ENCOUNTER — TELEPHONE (OUTPATIENT)
Dept: INTERNAL MEDICINE | Facility: CLINIC | Age: 38
End: 2023-11-21
Payer: MEDICARE

## 2024-01-29 DIAGNOSIS — F41.8 DEPRESSION WITH ANXIETY: ICD-10-CM

## 2024-01-29 RX ORDER — FLUOXETINE 10 MG/1
20 CAPSULE ORAL DAILY
Qty: 90 CAPSULE | Refills: 3 | Status: SHIPPED | OUTPATIENT
Start: 2024-01-29 | End: 2024-01-30 | Stop reason: SDUPTHER

## 2024-01-30 DIAGNOSIS — F41.8 DEPRESSION WITH ANXIETY: ICD-10-CM

## 2024-01-30 RX ORDER — FLUOXETINE 10 MG/1
20 CAPSULE ORAL DAILY
Qty: 90 CAPSULE | Refills: 3 | Status: SHIPPED | OUTPATIENT
Start: 2024-01-30

## 2024-02-06 ENCOUNTER — TELEPHONE (OUTPATIENT)
Dept: INTERNAL MEDICINE | Facility: CLINIC | Age: 39
End: 2024-02-06
Payer: MEDICARE

## 2024-02-06 NOTE — TELEPHONE ENCOUNTER
They are going to fax a 4 page OT plan of care for this patient to be sent back to them to the Fax in the back [phone] on 02/06, please look for this and return to them when complete. [place]

## 2024-02-26 ENCOUNTER — TELEPHONE (OUTPATIENT)
Dept: INTERNAL MEDICINE | Facility: CLINIC | Age: 39
End: 2024-02-26
Payer: MEDICARE

## 2024-02-26 NOTE — TELEPHONE ENCOUNTER
Form for plan of care for her speech, was to have been faxed to us. Needs to be signed by Dr. SIERRA and faxed back to them at [phone], asap

## 2024-05-31 ENCOUNTER — TELEPHONE (OUTPATIENT)
Dept: INTERNAL MEDICINE | Facility: CLINIC | Age: 39
End: 2024-05-31
Payer: MEDICARE

## 2024-05-31 NOTE — TELEPHONE ENCOUNTER
[employer] is needing plan of care paperwork signed for Speech Therapy Re-certification. We have the fax from the company and they are aware that pcp is out of the office. Is this something that another provider would feel comfortable reviewing and signing while Dr Blanchard is out?

## 2024-07-22 ENCOUNTER — OFFICE VISIT (OUTPATIENT)
Dept: INTERNAL MEDICINE | Facility: CLINIC | Age: 39
End: 2024-07-22
Payer: MEDICARE

## 2024-07-22 VITALS
OXYGEN SATURATION: 99 % | BODY MASS INDEX: 18.55 KG/M2 | DIASTOLIC BLOOD PRESSURE: 78 MMHG | HEIGHT: 62 IN | HEART RATE: 97 BPM | SYSTOLIC BLOOD PRESSURE: 106 MMHG | TEMPERATURE: 98 F | WEIGHT: 100.8 LBS

## 2024-07-22 DIAGNOSIS — Z00.00 MEDICARE ANNUAL WELLNESS VISIT, SUBSEQUENT: Primary | ICD-10-CM

## 2024-07-22 PROCEDURE — G0439 PPPS, SUBSEQ VISIT: HCPCS | Performed by: FAMILY MEDICINE

## 2024-07-22 PROCEDURE — 1170F FXNL STATUS ASSESSED: CPT | Performed by: FAMILY MEDICINE

## 2024-07-22 NOTE — PROGRESS NOTES
Subjective   The ABCs of the Annual Wellness Visit  Medicare Wellness Visit      Paradise Catherine is a 39 y.o. patient who presents for a Medicare Wellness Visit.    The following portions of the patient's history were reviewed and   updated as appropriate: allergies, current medications, past family history, past medical history, past social history, past surgical history, and problem list.    Compared to one year ago, the patient's physical   health is the same.  Compared to one year ago, the patient's mental   health is the same.    Recent Hospitalizations:  She was not admitted to the hospital during the last year.     Current Medical Providers:  Patient Care Team:  Drew Blanchard MD as PCP - General (Family Medicine)  Angel Roque MD as PCP - Family Medicine  Giovanni Lewis MD as Consulting Physician (Endocrinology)  Mickey Vyas Jr., MD as Consulting Physician (Neurology)  Migue Echavarria MD as Consulting Physician (Allergy)    Outpatient Medications Prior to Visit   Medication Sig Dispense Refill    acetaminophen (Tylenol) 325 MG tablet Take 1 tablet by mouth Every 6 (Six) Hours As Needed for Mild Pain. 30 tablet 11    Cholecalciferol 2000 UNITS capsule Take 5,000 Units by mouth.      Continuous Blood Gluc Sensor (DEXCOM G6 SENSOR) Every 10 (Ten) Days. 3 each 13    Continuous Blood Gluc Transmit (DEXCOM G6 TRANSMITTER) misc 1 package Every 3 (Three) Months. 1 each 3    Diapers & Supplies misc Depends pull-ups in small/medium.  Use as needed.  Needs 2 cases every other month. 2 each 5    diazePAM (DIASTAT ACUDIAL) 20 MG rectal kit PLACE 20 MG RECTALLY AS NEEDED (SEZIURE LASTING GREATER THAN 5 MINUTES OR MULTIPLE SEIZURES) MAX DAILY AMOUNT: 20MG      diphenhydrAMINE (BENADRYL) 25 MG tablet Take 1 tablet by mouth Every 6 (Six) Hours As Needed for Itching. 30 tablet 11    FLUoxetine (PROzac) 10 MG capsule Take 2 capsules by mouth Daily. 90 capsule 3    glucose (DEX4) 4 GM chewable  tablet Chew 4 tablets.      Insulin Aspart, w/Niacinamide, (Fiasp PenFill) 100 UNIT/ML solution cartridge Up to 25 units daily as directed.      Insulin Glargine (BASAGLAR KWIKPEN) 100 UNIT/ML injection pen Inject 20 Units under the skin into the appropriate area as directed 2 (Two) Times a Day. 15 mL 3    Insulin Pen Needle (PEN NEEDLES) 32G X 4 MM misc Inject 32 gauzes under the skin into the appropriate area as directed 5 (Five) Times a Day. PT TO USE WITH INSULIN PENS 5 TIMES DAILY 200 each 11    levocetirizine (XYZAL) 5 MG tablet Take 1 tablet by mouth Every Evening.      midazolam (VERSED) 5 MG/ML injection 2 mL into the nostril(s) as directed by provider.      ONETOUCH VERIO test strip Pt checks bs 4 times daily 200 each 3    OXcarbazepine (TRILEPTAL) 600 MG tablet Take 1.5 tablets by mouth 2 (Two) Times a Day. 90 tablet 11    topiramate (TOPAMAX) 25 MG tablet Take 1 tablet by mouth 2 (Two) Times a Day.      topiramate (TOPAMAX) 50 MG tablet Take 1 tablet by mouth 2 (Two) Times a Day. (Patient taking differently: Take 1.5 tablets by mouth 2 (Two) Times a Day.) 60 tablet 11    Tresiba FlexTouch 100 UNIT/ML solution pen-injector injection        No facility-administered medications prior to visit.     No opioid medication identified on active medication list. I have reviewed chart for other potential  high risk medication/s and harmful drug interactions in the elderly.      Aspirin is not on active medication list.  Aspirin use is not indicated based on review of current medical condition/s. Risk of harm outweighs potential benefits.  .    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    Seizure disorder    Vitamin D deficiency    Chronic urticaria    Mental retardation    Depression with anxiety    Diabetes mellitus type 1, uncontrolled, with complications    Encounter for long-term (current) use of insulin     "Hyperinsulinism    Type 1 diabetes mellitus with complications    Annual physical exam    Mental developmental delay    Long-term use of high-risk medication    Status post VNS (vagus nerve stimulator) placement    Localization-related (focal) (partial) idiopathic epilepsy and epileptic syndromes with seizures of localized onset, intractable, without status epilepticus     Advance Care Planning (Click this link to access ACP Navigator)  Advance Directive is not on file.  ACP discussion was held with the patient during this visit. Patient does not have an advance directive, declines further assistance.        Objective   Vitals:    24 1349   BP: 106/78   BP Location: Left arm   Patient Position: Sitting   Cuff Size: Adult   Pulse: 97   Temp: 98 °F (36.7 °C)   TempSrc: Infrared   SpO2: 99%   Weight: 45.7 kg (100 lb 12.8 oz)   Height: 157.5 cm (62.01\")       Estimated body mass index is 18.43 kg/m² as calculated from the following:    Height as of this encounter: 157.5 cm (62.01\").    Weight as of this encounter: 45.7 kg (100 lb 12.8 oz).    BMI is below normal parameters (malnutrition). Recommendations: none (medical contraindication)        Does the patient have evidence of cognitive impairment?  Mental retardation.                                                                                               Health  Risk Assessment    Smoking Status:  Social History     Tobacco Use   Smoking Status Never    Passive exposure: Never   Smokeless Tobacco Never     Alcohol Consumption:  Social History     Substance and Sexual Activity   Alcohol Use No     Fall Risk Screen:  STEADI Fall Risk Assessment was completed, and patient is at LOW risk for falls.Assessment completed on:2024    Depression Screenin/22/2024     1:52 PM   PHQ-2/PHQ-9 Depression Screening   Little Interest or Pleasure in Doing Things 0-->not at all   Feeling Down, Depressed or Hopeless 0-->not at all   PHQ-9: Brief Depression " Severity Measure Score 0     Health Habits and Functional and Cognitive Screenin/22/2024     1:53 PM   Functional & Cognitive Status   Do you have difficulty preparing food and eating? Yes   Do you have difficulty bathing yourself, getting dressed or grooming yourself? Yes   Do you have difficulty using the toilet? Yes   Do you have difficulty moving around from place to place? Yes   Do you have trouble with steps or getting out of a bed or a chair? No   Current Diet Well Balanced Diet   Dental Exam Up to date   Eye Exam Up to date   Current Exercises Include Walking   Do you need help using the phone?  Yes   Are you deaf or do you have serious difficulty hearing?  No   Do you need help to go to places out of walking distance? Yes   Do you need help shopping? Yes   Do you need help preparing meals?  Yes   Do you need help with housework?  Yes   Do you need help with laundry? Yes   Do you need help taking your medications? Yes   Do you need help managing money? Yes   Do you ever drive or ride in a car without wearing a seat belt? No   Have you felt unusual stress, anger or loneliness in the last month? No   Who do you live with? Other   If you need help, do you have trouble finding someone available to you? No   Have you been bothered in the last four weeks by sexual problems? No   Do you have difficulty concentrating, remembering or making decisions? Yes             Age-appropriate Screening Schedule:  Refer to the list below for future screening recommendations based on patient's age, sex and/or medical conditions. Orders for these recommended tests are listed in the plan section. The patient has been provided with a written plan.    Health Maintenance List  Health Maintenance   Topic Date Due    URINE MICROALBUMIN  Never done    BMI FOLLOWUP  Never done    Hepatitis B (1 of 3 - 19+ 3-dose series) Never done    HEPATITIS C SCREENING  Never done    DIABETIC FOOT EXAM  Never done    PAP SMEAR  Never done     DIABETIC EYE EXAM  03/22/2024    INFLUENZA VACCINE  08/01/2024    HEMOGLOBIN A1C  11/13/2024    ANNUAL WELLNESS VISIT  07/22/2025    TDAP/TD VACCINES (2 - Td or Tdap) 07/18/2032    COVID-19 Vaccine  Completed    Pneumococcal Vaccine 0-64  Completed                                                                                                                                                CMS Preventative Services Quick Reference  Risk Factors Identified During Encounter  Dental Screening Recommended: Has upcoming dental exam under anesthesia.    The above risks/problems have been discussed with the patient.  Pertinent information has been shared with the patient in the After Visit Summary.  An After Visit Summary and PPPS were made available to the patient.    Follow Up:   Next Medicare Wellness visit to be scheduled in 1 year.     Assessment & Plan  Medicare annual wellness visit, subsequent          Continue fluoxetine 10 mg daily.     DMI, doing well per Dr. Gerardo, endocrinologist.  Full labs pending    Seizure disorder controlled per Dr. You.     Still going to [facility] [facility] and loving it.           Follow Up:   No follow-ups on file.

## 2024-08-14 DIAGNOSIS — F41.8 DEPRESSION WITH ANXIETY: ICD-10-CM

## 2024-08-14 RX ORDER — FLUOXETINE 10 MG/1
20 CAPSULE ORAL DAILY
Qty: 90 CAPSULE | Refills: 3 | Status: SHIPPED | OUTPATIENT
Start: 2024-08-14

## 2024-08-14 NOTE — TELEPHONE ENCOUNTER
Rx Refill Note  Requested Prescriptions     Pending Prescriptions Disp Refills    FLUoxetine (PROzac) 10 MG capsule 90 capsule 3     Sig: Take 2 capsules by mouth Daily.      Last office visit with prescribing clinician: 7/22/2024   Last telemedicine visit with prescribing clinician: Visit date not found   Next office visit with prescribing clinician: 7/24/2025                         Would you like a call back once the refill request has been completed: [] Yes [] No    If the office needs to give you a call back, can they leave a voicemail: [] Yes [] No    Anna Arshad MA  08/14/24, 14:22 EDT

## 2025-03-14 NOTE — TELEPHONE ENCOUNTER
Caller: Trish Catherine    Relationship: Mother    Best call back number: [phone]     Requested Prescriptions:   Requested Prescriptions     Pending Prescriptions Disp Refills    acetaminophen (Tylenol) 325 MG tablet 30 tablet 11     Sig: Take 1 tablet by mouth Every 6 (Six) Hours As Needed for Mild Pain.    diphenhydrAMINE (BENADRYL) 25 MG tablet 30 tablet 11     Sig: Take 1 tablet by mouth Every 6 (Six) Hours As Needed for Itching.        Pharmacy where request should be sent: [id number] [place] [facility] [address] - [phone]  - [phone] FX     Last office visit with prescribing clinician: 7/22/2024   Last telemedicine visit with prescribing clinician: Visit date not found   Next office visit with prescribing clinician: 7/24/2025     Would you like a call back once the refill request has been completed: [] Yes [x] No    If the office needs to give you a call back, can they leave a voicemail: [] Yes [x] No    Iqra Hurd Rep   03/14/25 15:20 EDT

## 2025-03-17 RX ORDER — ACETAMINOPHEN 325 MG/1
325 TABLET ORAL EVERY 6 HOURS PRN
Qty: 30 TABLET | Refills: 11 | Status: SHIPPED | OUTPATIENT
Start: 2025-03-17

## 2025-03-17 RX ORDER — DIPHENHYDRAMINE HCL 25 MG
25 TABLET ORAL EVERY 6 HOURS PRN
Qty: 30 TABLET | Refills: 11 | Status: SHIPPED | OUTPATIENT
Start: 2025-03-17

## 2025-03-17 NOTE — TELEPHONE ENCOUNTER
No checkmarks available    Rx Refill Note  Requested Prescriptions     Pending Prescriptions Disp Refills    acetaminophen (Tylenol) 325 MG tablet 30 tablet 11     Sig: Take 1 tablet by mouth Every 6 (Six) Hours As Needed for Mild Pain.    diphenhydrAMINE (BENADRYL) 25 MG tablet 30 tablet 11     Sig: Take 1 tablet by mouth Every 6 (Six) Hours As Needed for Itching.      Last office visit with prescribing clinician: 7/22/2024   Last telemedicine visit with prescribing clinician: Visit date not found   Next office visit with prescribing clinician: 7/24/2025                         Would you like a call back once the refill request has been completed: [] Yes [] No    If the office needs to give you a call back, can they leave a voicemail: [] Yes [] No    Sapphire Gomez MA  03/17/25, 07:55 EDT

## 2025-07-31 ENCOUNTER — OFFICE VISIT (OUTPATIENT)
Dept: INTERNAL MEDICINE | Facility: CLINIC | Age: 40
End: 2025-07-31
Payer: MEDICARE

## 2025-07-31 VITALS
WEIGHT: 98.2 LBS | SYSTOLIC BLOOD PRESSURE: 112 MMHG | TEMPERATURE: 98 F | BODY MASS INDEX: 18.07 KG/M2 | DIASTOLIC BLOOD PRESSURE: 70 MMHG | HEIGHT: 62 IN

## 2025-07-31 DIAGNOSIS — G40.909 SEIZURE DISORDER: ICD-10-CM

## 2025-07-31 DIAGNOSIS — G80.8 OTHER CEREBRAL PALSY: ICD-10-CM

## 2025-07-31 DIAGNOSIS — Z79.4 ENCOUNTER FOR LONG-TERM (CURRENT) USE OF INSULIN: ICD-10-CM

## 2025-07-31 DIAGNOSIS — F41.8 DEPRESSION WITH ANXIETY: ICD-10-CM

## 2025-07-31 DIAGNOSIS — Z00.00 MEDICARE ANNUAL WELLNESS VISIT, SUBSEQUENT: Primary | ICD-10-CM

## 2025-07-31 RX ORDER — MIDAZOLAM 5 MG/.1ML
5 SPRAY NASAL
COMMUNITY
Start: 2025-04-14

## 2025-07-31 RX ORDER — DIPHENHYDRAMINE HCL 25 MG
25 TABLET ORAL EVERY 6 HOURS PRN
Qty: 30 TABLET | Refills: 11 | Status: SHIPPED | OUTPATIENT
Start: 2025-07-31

## 2025-07-31 RX ORDER — ACETAMINOPHEN 325 MG/1
325 TABLET ORAL EVERY 6 HOURS PRN
Qty: 30 TABLET | Refills: 11 | Status: SHIPPED | OUTPATIENT
Start: 2025-07-31

## 2025-07-31 NOTE — ASSESSMENT & PLAN NOTE
Continue fluoxetine 10 mg daily.     DMI, doing well per Dr. Gerardo, endocrinologist.  Last labs reviewed.     Seizure disorder controlled per neurology.     Still going to [employer] [facility] and loving it.

## 2025-07-31 NOTE — PROGRESS NOTES
Subjective   The ABCs of the Annual Wellness Visit  Medicare Wellness Visit      Paradise Catherine is a 40 y.o. patient who presents for a Medicare Wellness Visit.    The following portions of the patient's history were reviewed and   updated as appropriate: allergies, current medications, past family history, past medical history, past social history, past surgical history, and problem list.    Compared to one year ago, the patient's physical   health is the same.  Compared to one year ago, the patient's mental   health is the same.    Recent Hospitalizations:  She was not admitted to the hospital during the last year.     Current Medical Providers:  Patient Care Team:  Drew Blanchard MD as PCP - General (Family Medicine)  Angel Roqeu MD as PCP - Family Medicine  Giovanni Lewis MD as Consulting Physician (Endocrinology)  Mickey Vyas Jr., MD as Consulting Physician (Neurology)  Migue Echavarria MD as Consulting Physician (Allergy)    Outpatient Medications Prior to Visit   Medication Sig Dispense Refill    Cholecalciferol 2000 UNITS capsule Take 5,000 Units by mouth.      Continuous Blood Gluc Sensor (DEXCOM G6 SENSOR) Every 10 (Ten) Days. 3 each 13    Continuous Blood Gluc Transmit (DEXCOM G6 TRANSMITTER) misc 1 package Every 3 (Three) Months. 1 each 3    Diapers & Supplies misc Depends pull-ups in small/medium.  Use as needed.  Needs 2 cases every other month. 2 each 5    diazePAM (DIASTAT ACUDIAL) 20 MG rectal kit PLACE 20 MG RECTALLY AS NEEDED (SEZIURE LASTING GREATER THAN 5 MINUTES OR MULTIPLE SEIZURES) MAX DAILY AMOUNT: 20MG      FLUoxetine (PROzac) 10 MG capsule Take 2 capsules by mouth Daily. 90 capsule 3    glucose (DEX4) 4 GM chewable tablet Chew 4 tablets.      Insulin Aspart, w/Niacinamide, (Fiasp PenFill) 100 UNIT/ML solution cartridge Up to 25 units daily as directed.      Insulin Glargine (BASAGLAR KWIKPEN) 100 UNIT/ML injection pen Inject 20 Units under the skin into  the appropriate area as directed 2 (Two) Times a Day. 15 mL 3    Insulin Pen Needle (PEN NEEDLES) 32G X 4 MM misc Inject 32 gauzes under the skin into the appropriate area as directed 5 (Five) Times a Day. PT TO USE WITH INSULIN PENS 5 TIMES DAILY 200 each 11    levocetirizine (XYZAL) 5 MG tablet Take 1 tablet by mouth Every Evening.      midazolam (VERSED) 5 MG/ML injection Administer 2 mL into the nostril(s) as directed by provider.      Nayzilam 5 MG/0.1ML solution Administer 0.1 mL into the nostril(s) as directed by provider.      ONETOUCH VERIO test strip Pt checks bs 4 times daily 200 each 3    OXcarbazepine (TRILEPTAL) 600 MG tablet Take 1.5 tablets by mouth 2 (Two) Times a Day. 90 tablet 11    topiramate (TOPAMAX) 25 MG tablet Take 1 tablet by mouth 2 (Two) Times a Day.      topiramate (TOPAMAX) 50 MG tablet Take 1 tablet by mouth 2 (Two) Times a Day. (Patient taking differently: Take 1.5 tablets by mouth 2 (Two) Times a Day.) 60 tablet 11    Tresiba FlexTouch 100 UNIT/ML solution pen-injector injection       acetaminophen (Tylenol) 325 MG tablet Take 1 tablet by mouth Every 6 (Six) Hours As Needed for Mild Pain. 30 tablet 11    diphenhydrAMINE (BENADRYL) 25 MG tablet Take 1 tablet by mouth Every 6 (Six) Hours As Needed for Itching. 30 tablet 11     No facility-administered medications prior to visit.     No opioid medication identified on active medication list. I have reviewed chart for other potential  high risk medication/s and harmful drug interactions in the elderly.      Aspirin is not on active medication list.  Aspirin use is not indicated based on review of current medical condition/s. Risk of harm outweighs potential benefits.  .    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    Seizure disorder    Vitamin D deficiency    Chronic urticaria    Mental retardation    Depression with anxiety    Type 1 diabetes  "mellitus with hyperglycemia    Encounter for long-term (current) use of insulin    Hyperinsulinism    Type 1 diabetes mellitus with complications    Annual physical exam    Mental developmental delay    Long-term use of high-risk medication    Status post VNS (vagus nerve stimulator) placement    Localization-related (focal) (partial) idiopathic epilepsy and epileptic syndromes with seizures of localized onset, intractable, without status epilepticus     Advance Care Planning Advance Directive is not on file.  ACP discussion was held with the patient during this visit. Patient does not have an advance directive, declines further assistance.            Objective   Vitals:    07/31/25 1308   BP: 112/70   BP Location: Right arm   Patient Position: Sitting   Cuff Size: Adult   Temp: 98 °F (36.7 °C)   TempSrc: Infrared   Weight: 44.5 kg (98 lb 3.2 oz)   Height: 157.5 cm (62.01\")   PainSc: 0-No pain       Estimated body mass index is 17.96 kg/m² as calculated from the following:    Height as of this encounter: 157.5 cm (62.01\").    Weight as of this encounter: 44.5 kg (98 lb 3.2 oz).    BMI is below normal parameters (malnutrition). Recommendations: none (medical contraindication)           Does the patient have evidence of cognitive impairment? Yes                                                                                                Health  Risk Assessment    Smoking Status:  Social History     Tobacco Use   Smoking Status Never    Passive exposure: Never   Smokeless Tobacco Never     Alcohol Consumption:  Social History     Substance and Sexual Activity   Alcohol Use No       Fall Risk Screen  STEADI Fall Risk Assessment was completed, and patient is at LOW risk for falls.Assessment completed on:7/31/2025    Depression Screening   Little interest or pleasure in doing things? Not at all   Feeling down, depressed, or hopeless? Not at all   PHQ-2 Total Score 0      Health Habits and Functional and Cognitive " Screenin/31/2025     1:05 PM   Functional & Cognitive Status   Do you have difficulty preparing food and eating? Yes   Do you have difficulty bathing yourself, getting dressed or grooming yourself? Yes   Do you have difficulty using the toilet? Yes   Do you have difficulty moving around from place to place? Yes   Do you have trouble with steps or getting out of a bed or a chair? No   Current Diet Well Balanced Diet   Dental Exam Up to date   Eye Exam Up to date   Exercise (times per week) 2 times per week   Do you need help using the phone?  Yes   Are you deaf or do you have serious difficulty hearing?  No   Do you need help to go to places out of walking distance? Yes   Do you need help shopping? Yes   Do you need help preparing meals?  Yes   Do you need help with housework?  Yes   Do you need help with laundry? Yes   Do you need help taking your medications? Yes   Do you need help managing money? Yes   Do you ever drive or ride in a car without wearing a seat belt? No   Have you felt unusual fatigue (could be tiredness), stress, anger or loneliness in the last month? No   Who do you live with? Other   If you need help, do you have trouble finding someone available to you? No   Have you been bothered in the last four weeks by sexual problems? No   Do you have difficulty concentrating, remembering or making decisions? No           Age-appropriate Screening Schedule:  Refer to the list below for future screening recommendations based on patient's age, sex and/or medical conditions. Orders for these recommended tests are listed in the plan section. The patient has been provided with a written plan.    Health Maintenance List  Health Maintenance   Topic Date Due    DIABETIC FOOT EXAM  Never done    URINE MICROALBUMIN-CREATININE RATIO (uACR)  Never done    Hepatitis B (1 of 3 - 19+ 3-dose series) Never done    PAP SMEAR  Never done    HEPATITIS C SCREENING  Never done    INFLUENZA VACCINE  10/01/2025     "HEMOGLOBIN A1C  11/22/2025    DIABETIC EYE EXAM  03/20/2026    ANNUAL WELLNESS VISIT  07/31/2026    TDAP/TD VACCINES (2 - Td or Tdap) 07/18/2032    COVID-19 Vaccine  Completed    Pneumococcal Vaccine 0-49  Completed                                                                                                                                                CMS Preventative Services Quick Reference  Risk Factors Identified During Encounter  Immunizations Discussed/Encouraged: Influenza and COVID19 at pharmacy this fall.    The above risks/problems have been discussed with the patient.  Pertinent information has been shared with the patient in the After Visit Summary.  An After Visit Summary and PPPS were made available to the patient.    Follow Up:   Next Medicare Wellness visit to be scheduled in 1 year.         Additional E&M Note during same encounter follows:  Patient has additional, significant, and separately identifiable condition(s)/problem(s) that require work above and beyond the Medicare Wellness Visit     Chief Complaint  Medicare Wellness-subsequent and Cerebral Palsy    Subjective   HPI  Paradise is also being seen today for additional medical problem/s.                Objective   Vital Signs:  /70 (BP Location: Right arm, Patient Position: Sitting, Cuff Size: Adult)   Temp 98 °F (36.7 °C) (Infrared)   Ht 157.5 cm (62.01\")   Wt 44.5 kg (98 lb 3.2 oz)   BMI 17.96 kg/m²   Physical Exam               Assessment and Plan      Medicare annual wellness visit, subsequent         Depression with anxiety           Encounter for long-term (current) use of insulin         Seizure disorder         Other cerebral palsy         Continue fluoxetine 10 mg daily.     DMI, doing well per Dr. Gerardo, endocrinologist.  Last labs reviewed.     Seizure disorder controlled per neurology.     Still going to [facility] and loving it.            Follow Up   Return in about 1 year (around 7/31/2026) for Medicare " Wellness.  Patient was given instructions and counseling regarding her condition or for health maintenance advice. Please see specific information pulled into the AVS if appropriate.